# Patient Record
Sex: FEMALE | Race: AMERICAN INDIAN OR ALASKA NATIVE | NOT HISPANIC OR LATINO | Employment: FULL TIME | URBAN - METROPOLITAN AREA
[De-identification: names, ages, dates, MRNs, and addresses within clinical notes are randomized per-mention and may not be internally consistent; named-entity substitution may affect disease eponyms.]

---

## 2017-10-09 ENCOUNTER — OFFICE VISIT (OUTPATIENT)
Dept: URGENT CARE | Facility: URGENT CARE | Age: 22
End: 2017-10-09
Payer: COMMERCIAL

## 2017-10-09 VITALS
WEIGHT: 195.8 LBS | SYSTOLIC BLOOD PRESSURE: 106 MMHG | OXYGEN SATURATION: 97 % | DIASTOLIC BLOOD PRESSURE: 80 MMHG | HEART RATE: 86 BPM | TEMPERATURE: 99.6 F

## 2017-10-09 DIAGNOSIS — H65.92 OME (OTITIS MEDIA WITH EFFUSION), LEFT: ICD-10-CM

## 2017-10-09 DIAGNOSIS — R07.0 THROAT PAIN: ICD-10-CM

## 2017-10-09 DIAGNOSIS — J02.0 STREP THROAT: Primary | ICD-10-CM

## 2017-10-09 LAB
DEPRECATED S PYO AG THROAT QL EIA: ABNORMAL
SPECIMEN SOURCE: ABNORMAL

## 2017-10-09 PROCEDURE — 87880 STREP A ASSAY W/OPTIC: CPT | Performed by: PHYSICIAN ASSISTANT

## 2017-10-09 PROCEDURE — 99203 OFFICE O/P NEW LOW 30 MIN: CPT | Performed by: PHYSICIAN ASSISTANT

## 2017-10-09 RX ORDER — AMOXICILLIN 875 MG
875 TABLET ORAL 2 TIMES DAILY
Qty: 20 TABLET | Refills: 0 | Status: SHIPPED | OUTPATIENT
Start: 2017-10-09 | End: 2017-12-12

## 2017-10-09 NOTE — NURSING NOTE
Chief Complaint   Patient presents with     Urgent Care     URI     Pt states x 2 days ago has had congestion and sore throat. Pt has taken otc medications.       Initial /80 (BP Location: Right arm, Patient Position: Chair, Cuff Size: Adult Large)  Pulse 86  Temp 99.6  F (37.6  C) (Tympanic)  Wt 195 lb 12.8 oz (88.8 kg)  SpO2 97% There is no height or weight on file to calculate BMI.  Medication Reconciliation: unable or not appropriate to perform   Julianne Adams CMA (REMI) 10/9/2017 12:50 PM

## 2017-10-09 NOTE — MR AVS SNAPSHOT
"              After Visit Summary   10[facility]9/2017    [facility]    [facility]: [id number]           Patient Information     Date Of Birth          1995        Visit Information        Provider Department      10/9/2017 12:35 PM Jeniffer Segura PA-C [facility]        Today's Diagnoses     Strep throat    -  1    Throat pain        OME (otitis media with effusion), left           Follow-ups after your visit        Who to contact     If you have questions or need follow up information about today's clinic visit or your schedule please contact [facility] directly at [phone].  Normal or non-critical lab and imaging results will be communicated to you by [employer], letter or phone within 4 business days after the clinic has received the results. If you do not hear from us within 7 days, please contact the clinic through [employer] or phone. If you have a critical or abnormal lab result, we will notify you by phone as soon as possible.  Submit refill requests through Polar or call your pharmacy and they will forward the refill request to us. Please allow 3 business days for your refill to be completed.          Additional Information About Your Visit        MyChart Information     Polar lets you send messages to your doctor, view your test results, renew your prescriptions, schedule appointments and more. To sign up, go to [email or web address] . Click on \"Log in\" on the left side of the screen, which will take you to the Welcome page. Then click on \"Sign up Now\" on the right side of the page.     You will be asked to enter the access code listed below, as well as some personal information. Please follow the directions to create your username and password.     Your access code is: [email or web address]  Expires: 2018  1:23 PM     Your access code will  in 90 days. If you need help or a new code, please call your [facility] clinic or [phone].        Care EveryWhere ID     " This is your Care EveryWhere ID. This could be used by other organizations to access your [facility] medical records  [id number]        Your Vitals Were     Pulse Temperature Pulse Oximetry             86 99.6  F (37.6  C) (Tympanic) 97%          Blood Pressure from Last 3 Encounters:   10/09/17 106/80    Weight from Last 3 Encounters:   10/09/17 195 lb 12.8 oz (88.8 kg)              We Performed the Following     Strep, Rapid Screen          Today's Medication Changes          These changes are accurate as of: 10/9/17  1:23 PM.  If you have any questions, ask your nurse or doctor.               Start taking these medicines.        Dose/Directions    amoxicillin 875 MG tablet   Commonly known as:  AMOXIL   Used for:  Strep throat, OME (otitis media with effusion), left   Started by:  Jeniffer Segura PA-C        Dose:  875 mg   Take 1 tablet (875 mg) by mouth 2 times daily   Quantity:  20 tablet   Refills:  0            Where to get your medicines      These medications were sent to [facility] - [address]   [address]  [address], [place] [address]     Phone:  [phone]     amoxicillin 875 MG tablet                Primary Care Provider Office Phone # Fax #    [facility] [phone]       [address]  [place] [address]        Equal Access to Services     [facility] AH: Hadii jon kimball hadasho Soomaali, waaxda luqadaha, qaybta kaalmada adeegyada, kathy sawyer haykandi jackson. So [facility] [phone].    ATENCIÓN: Si habla español, tiene a collins disposición servicios gratuitos de asistencia lingüística. Llame al [phone].    We comply with applicable federal civil rights laws and [place] laws. We do not discriminate on the basis of race, color, national origin, age, disability, sex, sexual orientation, or gender identity.            Thank you!     Thank you for choosing [facility]  for  your care. Our goal is always to provide you with excellent care. Hearing back from our patients is one way we can continue to improve our services. Please take a few minutes to complete the written survey that you may receive in the mail after your visit with us. Thank you!             Your Updated Medication List - Protect others around you: Learn how to safely use, store and throw away your medicines at www.disposemymeds.org.          This list is accurate as of: 10/9/17  1:23 PM.  Always use your most recent med list.                   Brand Name Dispense Instructions for use Diagnosis    amoxicillin 875 MG tablet    AMOXIL    20 tablet    Take 1 tablet (875 mg) by mouth 2 times daily    Strep throat, OME (otitis media with effusion), left

## 2017-12-12 ENCOUNTER — OFFICE VISIT (OUTPATIENT)
Dept: PEDIATRICS | Facility: CLINIC | Age: 22
End: 2017-12-12
Payer: COMMERCIAL

## 2017-12-12 VITALS
SYSTOLIC BLOOD PRESSURE: 122 MMHG | HEIGHT: 64 IN | TEMPERATURE: 98.3 F | BODY MASS INDEX: 34.15 KG/M2 | OXYGEN SATURATION: 99 % | WEIGHT: 200 LBS | DIASTOLIC BLOOD PRESSURE: 82 MMHG | HEART RATE: 85 BPM

## 2017-12-12 DIAGNOSIS — F43.23 ADJUSTMENT DISORDER WITH MIXED ANXIETY AND DEPRESSED MOOD: ICD-10-CM

## 2017-12-12 DIAGNOSIS — Z11.3 ROUTINE SCREENING FOR STI (SEXUALLY TRANSMITTED INFECTION): ICD-10-CM

## 2017-12-12 DIAGNOSIS — Z00.00 HEALTHCARE MAINTENANCE: Primary | ICD-10-CM

## 2017-12-12 DIAGNOSIS — Z23 NEED FOR VACCINATION: ICD-10-CM

## 2017-12-12 PROCEDURE — 90715 TDAP VACCINE 7 YRS/> IM: CPT | Performed by: NURSE PRACTITIONER

## 2017-12-12 PROCEDURE — G0145 SCR C/V CYTO,THINLAYER,RESCR: HCPCS | Performed by: NURSE PRACTITIONER

## 2017-12-12 PROCEDURE — 90471 IMMUNIZATION ADMIN: CPT | Performed by: NURSE PRACTITIONER

## 2017-12-12 PROCEDURE — 99395 PREV VISIT EST AGE 18-39: CPT | Mod: 25 | Performed by: NURSE PRACTITIONER

## 2017-12-12 PROCEDURE — 99212 OFFICE O/P EST SF 10 MIN: CPT | Mod: 25 | Performed by: NURSE PRACTITIONER

## 2017-12-12 RX ORDER — OMEGA-3 FATTY ACIDS/FISH OIL 300-1000MG
1 CAPSULE ORAL DAILY
Qty: 90 CAPSULE | Refills: 3 | Status: SHIPPED | OUTPATIENT
Start: 2017-12-12 | End: 2018-12-28

## 2017-12-12 RX ORDER — CHOLECALCIFEROL (VITAMIN D3) 50 MCG
2000 TABLET ORAL DAILY
Qty: 90 TABLET | Refills: 3 | Status: SHIPPED | OUTPATIENT
Start: 2017-12-12 | End: 2018-12-28

## 2017-12-12 RX ORDER — CITALOPRAM HYDROBROMIDE 20 MG/1
20 TABLET ORAL DAILY
Qty: 90 TABLET | Refills: 3 | Status: SHIPPED | OUTPATIENT
Start: 2017-12-12 | End: 2018-12-28

## 2017-12-12 ASSESSMENT — PATIENT HEALTH QUESTIONNAIRE - PHQ9
5. POOR APPETITE OR OVEREATING: SEVERAL DAYS
SUM OF ALL RESPONSES TO PHQ QUESTIONS 1-9: 10
10. IF YOU CHECKED OFF ANY PROBLEMS, HOW DIFFICULT HAVE THESE PROBLEMS MADE IT FOR YOU TO DO YOUR WORK, TAKE CARE OF THINGS AT HOME, OR GET ALONG WITH OTHER PEOPLE: EXTREMELY DIFFICULT
SUM OF ALL RESPONSES TO PHQ QUESTIONS 1-9: 10

## 2017-12-12 ASSESSMENT — ANXIETY QUESTIONNAIRES
1. FEELING NERVOUS, ANXIOUS, OR ON EDGE: NEARLY EVERY DAY
3. WORRYING TOO MUCH ABOUT DIFFERENT THINGS: MORE THAN HALF THE DAYS
IF YOU CHECKED OFF ANY PROBLEMS ON THIS QUESTIONNAIRE, HOW DIFFICULT HAVE THESE PROBLEMS MADE IT FOR YOU TO DO YOUR WORK, TAKE CARE OF THINGS AT HOME, OR GET ALONG WITH OTHER PEOPLE: EXTREMELY DIFFICULT
5. BEING SO RESTLESS THAT IT IS HARD TO SIT STILL: NOT AT ALL
2. NOT BEING ABLE TO STOP OR CONTROL WORRYING: MORE THAN HALF THE DAYS
GAD7 TOTAL SCORE: 12
7. FEELING AFRAID AS IF SOMETHING AWFUL MIGHT HAPPEN: SEVERAL DAYS
6. BECOMING EASILY ANNOYED OR IRRITABLE: NEARLY EVERY DAY

## 2017-12-12 NOTE — PROGRESS NOTES
SUBJECTIVE:   CC: Joann Toscano is an 22 year old woman who presents for preventive health visit.   Patient declines flu vaccine today.  States has had gardasil vaccines already.    Physical   Annual:     Getting at least 3 servings of Calcium per day::  Yes    Bi-annual eye exam::  Yes    Dental care twice a year::  NO    Sleep apnea or symptoms of sleep apnea::  None    Diet::  Regular (no restrictions)    Frequency of exercise::  2-3 days/week    Duration of exercise::  15-30 minutes    Taking medications regularly::  Yes    Medication side effects::  Other    Additional concerns today::  YES    Concerns today: wants to discuss birth control pills  Wants to discuss anxiety - PHQ9 and WADE filled out today    -------------------------------------    Today's PHQ-2 Score:   PHQ-2 ( 1999 Pfizer) 12/12/2017   Q1: Little interest or pleasure in doing things 2   Q2: Feeling down, depressed or hopeless 1   PHQ-2 Score 3   Q1: Little interest or pleasure in doing things More than half the days   Q2: Feeling down, depressed or hopeless Several days   PHQ-2 Score 3     Answers for HPI/ROS submitted by the patient on 12/12/2017   PHQ-2 Score: 3  If you checked off any problems, how difficult have these problems made it for you to do your work, take care of things at home, or get along with other people?: Extremely difficult  PHQ9 TOTAL SCORE: 10    Abuse: Current or Past(Physical, Sexual or Emotional)- No  Do you feel safe in your environment - Yes    Social History   Substance Use Topics     Smoking status: Never Smoker     Smokeless tobacco: Never Used     Alcohol use Yes      Comment: 2 times per week, 2 per time     The patient does not drink >3 drinks per day nor >7 drinks per week.    Reviewed orders with patient.  Reviewed health maintenance and updated orders accordingly - Yes  Labs reviewed in EPIC    Mammogram not appropriate for this patient based on age.    Pertinent mammograms are reviewed under the  "imaging tab.  History of abnormal Pap smear: NO - age 21-29 PAP every 3 years recommended    Reviewed and updated as needed this visit by clinical staff  Tobacco  Allergies  Med Hx  Surg Hx  Fam Hx  Soc Hx        Reviewed and updated as needed this visit by Provider        Past Medical History:   Diagnosis Date     NO ACTIVE PROBLEMS         Review of Systems  C: NEGATIVE for fever, chills, change in weight  I: NEGATIVE for worrisome rashes, moles or lesions  E: NEGATIVE for vision changes or irritation  ENT: NEGATIVE for ear, mouth and throat problems  R: NEGATIVE for significant cough or SOB  B: NEGATIVE for masses, tenderness or discharge  CV: NEGATIVE for chest pain, palpitations or peripheral edema  GI: NEGATIVE for nausea, abdominal pain, heartburn, or change in bowel habits  : NEGATIVE for unusual urinary or vaginal symptoms. Periods are regular.  M: NEGATIVE for significant arthralgias or myalgia  N: NEGATIVE for weakness, dizziness or paresthesias  PSYCHIATRIC: anxiety     OBJECTIVE:   /82 (Cuff Size: Adult Large)  Pulse 85  Temp 98.3  F (36.8  C) (Tympanic)  Ht 5' 3.5\" (1.613 m)  Wt 200 lb (90.7 kg)  LMP 11/22/2017 (Approximate)  SpO2 99%  BMI 34.87 kg/m2  Physical Exam  GENERAL: healthy, alert and no distress  EYES: Eyes grossly normal to inspection, PERRL and conjunctivae and sclerae normal  HENT: ear canals and TM's normal, nose and mouth without ulcers or lesions  NECK: no adenopathy, no asymmetry, masses, or scars and thyroid normal to palpation  RESP: lungs clear to auscultation - no rales, rhonchi or wheezes  CV: regular rate and rhythm, normal S1 S2, no S3 or S4, no murmur, click or rub, no peripheral edema and peripheral pulses strong  ABDOMEN: soft, nontender, no hepatosplenomegaly, no masses and bowel sounds normal   (female): normal female external genitalia, normal urethral meatus, vaginal mucosa pink, moist, well rugated, and normal cervix/adnexa/uterus without masses or " discharge  MS: no gross musculoskeletal defects noted, no edema  SKIN: no suspicious lesions or rashes  NEURO: Normal strength and tone, mentation intact and speech normal  PSYCH: mentation appears normal, affect normal/bright    ASSESSMENT/PLAN:   1. Healthcare maintenance  - PAP imaged thin layer screen only - recommended age 21 - 24 years    2. Routine screening for STI (sexually transmitted infection)  - CHLAMYDIA TRACHOMATIS PCR    3. Need for vaccination  - TDAP VACCINE (ADACEL)  - ADMIN 1st VACCINE    4. Adjustment disorder with mixed anxiety and depressed mood  Patient with significant anxiety and depression without any suicidal ideation. Has had to pull out of her semester at college due to anxiety. Has significant anxiety about doing anything new. Significant [facility] anxiety. No manic type sx.   - citalopram (CELEXA) 20 MG tablet; Take 1 tablet (20 mg) by mouth daily  Dispense: 90 tablet; Refill: 3  - MENTAL HEALTH REFERRAL  - Adult; Outpatient Treatment; Individual/Couples/Family/Group Therapy/Health Psychology; [facility] [phone]; We will contact you to schedule the appointment or please call with any questions  - omega 3 1000 MG CAPS; Take 1 g by mouth daily  Dispense: 90 capsule; Refill: 3  - Cholecalciferol (VITAMIN D) 2000 UNITS tablet; Take 2,000 Units by mouth daily  Dispense: 90 tablet; Refill: 3  -Start Celexa, fish oil, vitamin D. Discussed r/b/se, including increased SI.   -Discussed improving the quality of her diet  -Discussed sleeping no more than 8.5 hours per night (currently sleeping more like 10)  -Start therapy once weekly  -Signed paperwork for school stating she needed to pull out of classes, with the condition that she see therapy a minimum of once weekly     COUNSELING:  Reviewed preventive health counseling, as reflected in patient instructions         reports that she has never smoked. She has never used smokeless tobacco.    Estimated body mass index is  "34.87 kg/(m^2) as calculated from the following:    Height as of this encounter: 5' 3.5\" (1.613 m).    Weight as of this encounter: 200 lb (90.7 kg).   Weight management plan: Patient referred to endocrine and/or weight management specialty    Counseling Resources:  ATP IV Guidelines  Pooled Cohorts Equation Calculator  Breast Cancer Risk Calculator  FRAX Risk Assessment  ICSI Preventive Guidelines  Dietary Guidelines for Americans, 2010  USDA's MyPlate  ASA Prophylaxis  Lung CA Screening    JAVY Jones [facility]  "

## 2017-12-12 NOTE — LETTER
December 21, 2017      Joannkathleen Helton [facility]  [address]  [place] [facility] [address]    Dear ,      I am happy to inform you that your recent cervical cancer screening test (PAP smear) was normal.      Preventative screenings such as this help to ensure your health for years to come. You should repeat a pap smear in 3 years, unless otherwise directed.      You will still need to return to the clinic every year for your annual exam and other preventive tests.     Please contact the clinic at [phone] if you have further questions.       Sincerely,      Ermelinda Rios, JAVY CNP/esh

## 2017-12-12 NOTE — NURSING NOTE
"Chief Complaint   Patient presents with     Physical       Initial /82 (Cuff Size: Adult Large)  Pulse 85  Temp 98.3  F (36.8  C) (Tympanic)  Ht 5' 3.5\" (1.613 m)  Wt 200 lb (90.7 kg)  LMP 11/22/2017 (Approximate)  SpO2 99%  BMI 34.87 kg/m2 Estimated body mass index is 34.87 kg/(m^2) as calculated from the following:    Height as of this encounter: 5' 3.5\" (1.613 m).    Weight as of this encounter: 200 lb (90.7 kg).  Medication Reconciliation: complete   Eunice Laguerre CMA    "

## 2017-12-12 NOTE — MR AVS SNAPSHOT
After Visit Summary   12/12/2017    Joann Helton [facility]    MRN: [id number]           Patient Information     Date Of Birth          1995        Visit Information        Provider Department      12/12/2017 8:20 AM Ermelinda Rios APRN [facility] [place]        Today's Diagnoses     Healthcare maintenance    -  1    Routine screening for STI (sexually transmitted infection)        Need for vaccination        Adjustment disorder with mixed anxiety and depressed mood          Care Instructions    Try not to be in bed for more than 8.5 hours  Start fish oil. 1 G per day  Start vitamin D 2,000 iu per day  Try to eat anything other than pizza, chinese food, etc.  Start Celexa: 1/2 tab a day for 3 days then full tab.      See me in 1 month      Preventive Health Recommendations  Female Ages 18 to 25     Yearly exam:     See your health care provider every year in order to  o Review health changes.   o Discuss preventive care.    o Review your medicines if your doctor has prescribed any.      You should be tested each year for STDs (sexually transmitted diseases).       After age 20, talk to your provider about how often you should have cholesterol testing.      Starting at age 21, get a Pap test every three years. If you have an abnormal result, your doctor may have you test more often.      If you are at risk for diabetes, you should have a diabetes test (fasting glucose).     Shots:     Get a flu shot each year.     Get a tetanus shot every 10 years.     Consider getting the shot (vaccine) that prevents cervical cancer (Gardasil).    Nutrition:     Eat at least 5 servings of fruits and vegetables each day.    Eat whole-grain bread, whole-wheat pasta and brown rice instead of white grains and rice.    Talk to your provider about Calcium and Vitamin D.     Lifestyle    Exercise at least 150 minutes a week each week (30 minutes a day, 5 days a week). This will help you control your weight  and prevent disease.    Limit alcohol to one drink per day.    No smoking.     Wear sunscreen to prevent skin cancer.    See your dentist every six months for an exam and cleaning.          Follow-ups after your visit        Additional Services     MENTAL HEALTH REFERRAL  - Adult; Outpatient Treatment; Individual/Couples/Family/Group Therapy/Health Psychology; [facility]: [facility] [phone]; We will contact you to schedule the appointment or please call with any questions       All scheduling is subject to the client's specific insurance plan & benefits, provider/location availability, and provider clinical specialities.  Please arrive 15 minutes early for your first appointment and bring your completed paperwork.    Please be aware that coverage of these services is subject to the terms and limitations of your health insurance plan.  Call member services at your health plan with any benefit or coverage questions.                            Who to contact     If you have questions or need follow up information about today's clinic visit or your schedule please contact [facility] directly at [phone].  Normal or non-critical lab and imaging results will be communicated to you by Jimuboxhart, letter or phone within 4 business days after the clinic has received the results. If you do not hear from us within 7 days, please contact the clinic through [employer] or phone. If you have a critical or abnormal lab result, we will notify you by phone as soon as possible.  Submit refill requests through [email or web address] or call your pharmacy and they will forward the refill request to us. Please allow 3 business days for your refill to be completed.          Additional Information About Your Visit        Jim[employer] Information     [email or web address] lets you send messages to your doctor, view your test results, renew your prescriptions, schedule appointments and more. To sign up, go to [email or web address] . Click on  "\"Log in\" on the left side of the screen, which will take you to the Welcome page. Then click on \"Sign up Now\" on the right side of the page.     You will be asked to enter the access code listed below, as well as some personal information. Please follow the directions to create your username and password.     Your access code is: [email or web address]  Expires: 2018 12:23 PM     Your access code will  in 90 days. If you need help or a new code, please call your [facility] clinic or [phone].        Care EveryWhere ID     This is your Care EveryWhere ID. This could be used by other organizations to access your [facility] medical records  [id number]        Your Vitals Were     Pulse Temperature Height Last Period Pulse Oximetry BMI (Body Mass Index)    85 98.3  F (36.8  C) (Tympanic) 5' 3.5\" (1.613 m) 2017 (Approximate) 99% 34.87 kg/m2       Blood Pressure from Last 3 Encounters:   17 122/82   10/09/17 106/80    Weight from Last 3 Encounters:   17 200 lb (90.7 kg)   10/09/17 195 lb 12.8 oz (88.8 kg)              We Performed the Following     ADMIN 1st VACCINE     CHLAMYDIA TRACHOMATIS PCR     MENTAL HEALTH REFERRAL  - Adult; Outpatient Treatment; Individual/Couples/Family/Group Therapy/Health Psychology; [facility]: [facility] [phone]; We will contact you to schedule the appointment or please call with any questions     PAP imaged thin layer screen only - recommended age 21 - 24 years     SCREENING QUESTIONS FOR ADULT IMMUNIZATIONS     TDAP VACCINE (ADACEL)          Today's Medication Changes          These changes are accurate as of: 17  8:54 AM.  If you have any questions, ask your nurse or doctor.               Start taking these medicines.        Dose/Directions    citalopram 20 MG tablet   Commonly known as:  celeXA   Used for:  Adjustment disorder with mixed anxiety and depressed mood   Started by:  Ermelinda Rios APRN CNP        Dose:  20 mg   Take 1 tablet " (20 mg) by mouth daily   Quantity:  90 tablet   Refills:  3       omega 3 1000 MG Caps   Used for:  Adjustment disorder with mixed anxiety and depressed mood   Started by:  Ermelinda Rios APRN CNP        Dose:  1 g   Take 1 g by mouth daily   Quantity:  90 capsule   Refills:  3       vitamin D 2000 UNITS tablet   Used for:  Adjustment disorder with mixed anxiety and depressed mood   Started by:  Ermelinda Rios APRN CNP        Dose:  2000 Units   Take 2,000 Units by mouth daily   Quantity:  90 tablet   Refills:  3            Where to get your medicines      These medications were sent to [facility] [place] - [facility] - [address]   [address]  [address], [place] [address]     Phone:  [phone]     citalopram 20 MG tablet    omega 3 1000 MG Caps    vitamin D 2000 UNITS tablet                Primary Care Provider Office Phone # Fax #    [facility] [phone] [address]  [place] [address]        Equal Access to Services     [facility] AH: Hadii jon ku hadasho Soomaali, waaxda luqadaha, qaybta kaalmada adeegyada, waxay yudyin haykandi terrell . So [facility] [phone].    ATENCIÓN: Si lornala espdana, tiene a collins disposición servicios gratuitos de asistencia lingüística. Llame al [phone].    We comply with applicable federal civil rights laws and [place] laws. We do not discriminate on the basis of race, color, national origin, age, disability, sex, sexual orientation, or gender identity.            Thank you!     Thank you for choosing [facility]  for your care. Our goal is always to provide you with excellent care. Hearing back from our patients is one way we can continue to improve our services. Please take a few minutes to complete the written survey that you may receive in the mail after your visit with us. Thank you!             Your Updated Medication List - Protect others around you:  Learn how to safely use, store and throw away your medicines at www.disposemymeds.org.          This list is accurate as of: 12/12/17  8:54 AM.  Always use your most recent med list.                   Brand Name Dispense Instructions for use Diagnosis    citalopram 20 MG tablet    celeXA    90 tablet    Take 1 tablet (20 mg) by mouth daily    Adjustment disorder with mixed anxiety and depressed mood       omega 3 1000 MG Caps     90 capsule    Take 1 g by mouth daily    Adjustment disorder with mixed anxiety and depressed mood       vitamin D 2000 UNITS tablet     90 tablet    Take 2,000 Units by mouth daily    Adjustment disorder with mixed anxiety and depressed mood

## 2017-12-12 NOTE — PATIENT INSTRUCTIONS
Try not to be in bed for more than 8.5 hours  Start fish oil. 1 G per day  Start vitamin D 2,000 iu per day  Try to eat anything other than pizza, chinese food, etc.  Start Celexa: 1/2 tab a day for 3 days then full tab.      See me in 1 month      Preventive Health Recommendations  Female Ages 18 to 25     Yearly exam:     See your health care provider every year in order to  o Review health changes.   o Discuss preventive care.    o Review your medicines if your doctor has prescribed any.      You should be tested each year for STDs (sexually transmitted diseases).       After age 20, talk to your provider about how often you should have cholesterol testing.      Starting at age 21, get a Pap test every three years. If you have an abnormal result, your doctor may have you test more often.      If you are at risk for diabetes, you should have a diabetes test (fasting glucose).     Shots:     Get a flu shot each year.     Get a tetanus shot every 10 years.     Consider getting the shot (vaccine) that prevents cervical cancer (Gardasil).    Nutrition:     Eat at least 5 servings of fruits and vegetables each day.    Eat whole-grain bread, whole-wheat pasta and brown rice instead of white grains and rice.    Talk to your provider about Calcium and Vitamin D.     Lifestyle    Exercise at least 150 minutes a week each week (30 minutes a day, 5 days a week). This will help you control your weight and prevent disease.    Limit alcohol to one drink per day.    No smoking.     Wear sunscreen to prevent skin cancer.    See your dentist every six months for an exam and cleaning.

## 2017-12-13 DIAGNOSIS — Z11.3 ROUTINE SCREENING FOR STI (SEXUALLY TRANSMITTED INFECTION): Primary | ICD-10-CM

## 2017-12-13 LAB
C TRACH DNA SPEC QL NAA+PROBE: ABNORMAL
SPECIMEN SOURCE: ABNORMAL

## 2017-12-13 ASSESSMENT — PATIENT HEALTH QUESTIONNAIRE - PHQ9: SUM OF ALL RESPONSES TO PHQ QUESTIONS 1-9: 10

## 2017-12-13 ASSESSMENT — ANXIETY QUESTIONNAIRES: GAD7 TOTAL SCORE: 12

## 2017-12-14 LAB
COPATH REPORT: NORMAL
PAP: NORMAL

## 2018-12-28 DIAGNOSIS — F43.23 ADJUSTMENT DISORDER WITH MIXED ANXIETY AND DEPRESSED MOOD: ICD-10-CM

## 2018-12-28 NOTE — TELEPHONE ENCOUNTER
"Requested Prescriptions   Pending Prescriptions Disp Refills     citalopram (CELEXA) 20 MG tablet [Pharmacy Med Name: CITALOPRAM 20MG TAB]  Last Written Prescription Date:  12-  Last Fill Quantity: 90 tablet,  # refills: 3   Last office visit: 12/12/2017 with prescribing provider:     Future Office Visit:     90 tablet 3     Sig: TAKE ONE TABLET BY MOUTH EVERY DAY    SSRIs Protocol Failed - 12/28/2018  2:40 PM       Failed - Recent (12 mo) or future (30 days) visit within the authorizing provider's specialty    Patient had office visit in the last 12 months or has a visit in the next 30 days with authorizing provider or within the authorizing provider's specialty.  See \"Patient Info\" tab in inbasket, or \"Choose Columns\" in Meds & Orders section of the refill encounter.             Passed - Patient is age 18 or older       Passed - No active pregnancy on record       Passed - No positive pregnancy test in last 12 months                D-2000 MAXIMUM STRENGTH 2000 units tablet [Pharmacy Med Name: D-2000 MAXIMUM STRENGTH 2000 TABS]  Last Written Prescription Date:  12-  Last Fill Quantity: 90 tablet,  # refills: 3   Last office visit: 12/12/2017 with prescribing provider:     Future Office Visit:     110 tablet 3     Sig: TAKE ONE TABLET BY MOUTH EVERY DAY    Vitamin Supplements (Adult) Protocol Failed - 12/28/2018  2:40 PM       Failed - Recent (12 mo) or future (30 days) visit within the authorizing provider's specialty    Patient had office visit in the last 12 months or has a visit in the next 30 days with authorizing provider or within the authorizing provider's specialty.  See \"Patient Info\" tab in inbasket, or \"Choose Columns\" in Meds & Orders section of the refill encounter.             Passed - High dose Vitamin D not ordered                90 capsule 3     Sig: TAKE ONE CAPSULE BY MOUTH EVERY DAY    Antihyperlipidemic agents Failed - 12/28/2018  2:40 PM       Failed - Lipid panel on file in past " "12 mos    No lab results found.           Failed - Normal serum ALT on record in past 12 mos    No lab results found.         Failed - Recent (12 mo) or future (30 days) visit within the authorizing provider's specialty    Patient had office visit in the last 12 months or has a visit in the next 30 days with authorizing provider or within the authorizing provider's specialty.  See \"Patient Info\" tab in inbasket, or \"Choose Columns\" in Meds & Orders section of the refill encounter.             Passed - Patient is age 18 years or older       Passed - No active pregnancy on record       Passed - No positive pregnancy test in past 12 mos                                                                                                                 omega-3 acid ethyl esters (LOVAZA) 1 g capsule [Pharmacy Med Name: OMEGA-3-ACID ETHYL ESTERS 1GM CAPS]  Last Written Prescription Date:  12-  Last Fill Quantity: 90 capsule,  # refills: 3   Last office visit: 12/12/2017 with prescribing provider:     Future Office Visit:     90 capsule 3     Sig: TAKE ONE CAPSULE BY MOUTH EVERY DAY    Antihyperlipidemic agents Failed - 12/28/2018  2:40 PM       Failed - Lipid panel on file in past 12 mos    No lab results found.           Failed - Normal serum ALT on record in past 12 mos    No lab results found.         Failed - Recent (12 mo) or future (30 days) visit within the authorizing provider's specialty    Patient had office visit in the last 12 months or has a visit in the next 30 days with authorizing provider or within the authorizing provider's specialty.  See \"Patient Info\" tab in inbasket, or \"Choose Columns\" in Meds & Orders section of the refill encounter.             Passed - Patient is age 18 years or older       Passed - No active pregnancy on record       Passed - No positive pregnancy test in past 12 mos            "

## 2019-01-02 RX ORDER — CITALOPRAM HYDROBROMIDE 20 MG/1
TABLET ORAL
Qty: 30 TABLET | Refills: 0 | Status: SHIPPED | OUTPATIENT
Start: 2019-01-02 | End: 2023-11-14

## 2019-01-02 RX ORDER — OMEGA-3-ACID ETHYL ESTERS 1 G/1
CAPSULE, LIQUID FILLED ORAL
Qty: 90 CAPSULE | Refills: 0 | Status: SHIPPED | OUTPATIENT
Start: 2019-01-02 | End: 2023-11-14

## 2019-02-06 ENCOUNTER — OFFICE VISIT - HEALTHEAST (OUTPATIENT)
Dept: FAMILY MEDICINE | Facility: CLINIC | Age: 24
End: 2019-02-06

## 2019-02-06 ENCOUNTER — COMMUNICATION - HEALTHEAST (OUTPATIENT)
Dept: TELEHEALTH | Facility: CLINIC | Age: 24
End: 2019-02-06

## 2019-02-06 DIAGNOSIS — F32.0 CURRENT MILD EPISODE OF MAJOR DEPRESSIVE DISORDER WITHOUT PRIOR EPISODE (H): ICD-10-CM

## 2019-02-06 DIAGNOSIS — Z00.00 ROUTINE GENERAL MEDICAL EXAMINATION AT A HEALTH CARE FACILITY: ICD-10-CM

## 2019-02-06 LAB
ALBUMIN SERPL-MCNC: 3.9 G/DL (ref 3.5–5)
ALP SERPL-CCNC: 99 U/L (ref 45–120)
ALT SERPL W P-5'-P-CCNC: 22 U/L (ref 0–45)
ANION GAP SERPL CALCULATED.3IONS-SCNC: 5 MMOL/L (ref 5–18)
AST SERPL W P-5'-P-CCNC: 15 U/L (ref 0–40)
BASOPHILS # BLD AUTO: 0 THOU/UL (ref 0–0.2)
BASOPHILS NFR BLD AUTO: 1 % (ref 0–2)
BILIRUB SERPL-MCNC: 0.4 MG/DL (ref 0–1)
BUN SERPL-MCNC: 14 MG/DL (ref 8–22)
CALCIUM SERPL-MCNC: 9.8 MG/DL (ref 8.5–10.5)
CHLORIDE BLD-SCNC: 106 MMOL/L (ref 98–107)
CHOLEST SERPL-MCNC: 194 MG/DL
CO2 SERPL-SCNC: 31 MMOL/L (ref 22–31)
CREAT SERPL-MCNC: 0.86 MG/DL (ref 0.6–1.1)
EOSINOPHIL # BLD AUTO: 0.3 THOU/UL (ref 0–0.4)
EOSINOPHIL NFR BLD AUTO: 4 % (ref 0–6)
ERYTHROCYTE [DISTWIDTH] IN BLOOD BY AUTOMATED COUNT: 11.1 % (ref 11–14.5)
FASTING STATUS PATIENT QL REPORTED: YES
GFR SERPL CREATININE-BSD FRML MDRD: >60 ML/MIN/1.73M2
GLUCOSE BLD-MCNC: 87 MG/DL (ref 70–125)
HCT VFR BLD AUTO: 41.2 % (ref 35–47)
HDLC SERPL-MCNC: 47 MG/DL
HGB BLD-MCNC: 13.8 G/DL (ref 12–16)
LDLC SERPL CALC-MCNC: 126 MG/DL
LYMPHOCYTES # BLD AUTO: 3.8 THOU/UL (ref 0.8–4.4)
LYMPHOCYTES NFR BLD AUTO: 53 % (ref 20–40)
MCH RBC QN AUTO: 30 PG (ref 27–34)
MCHC RBC AUTO-ENTMCNC: 33.4 G/DL (ref 32–36)
MCV RBC AUTO: 90 FL (ref 80–100)
MONOCYTES # BLD AUTO: 0.4 THOU/UL (ref 0–0.9)
MONOCYTES NFR BLD AUTO: 6 % (ref 2–10)
NEUTROPHILS # BLD AUTO: 2.6 THOU/UL (ref 2–7.7)
NEUTROPHILS NFR BLD AUTO: 37 % (ref 50–70)
PLATELET # BLD AUTO: 356 THOU/UL (ref 140–440)
PMV BLD AUTO: 6.5 FL (ref 7–10)
POTASSIUM BLD-SCNC: 4.7 MMOL/L (ref 3.5–5)
PROT SERPL-MCNC: 7 G/DL (ref 6–8)
RBC # BLD AUTO: 4.59 MILL/UL (ref 3.8–5.4)
SODIUM SERPL-SCNC: 142 MMOL/L (ref 136–145)
T4 FREE SERPL-MCNC: 1 NG/DL (ref 0.7–1.8)
TRIGL SERPL-MCNC: 107 MG/DL
TSH SERPL DL<=0.005 MIU/L-ACNC: 5.28 UIU/ML (ref 0.3–5)
WBC: 7.1 THOU/UL (ref 4–11)

## 2019-02-06 ASSESSMENT — MIFFLIN-ST. JEOR: SCORE: 1709.77

## 2019-02-07 ENCOUNTER — COMMUNICATION - HEALTHEAST (OUTPATIENT)
Dept: FAMILY MEDICINE | Facility: CLINIC | Age: 24
End: 2019-02-07

## 2019-02-07 ENCOUNTER — AMBULATORY - HEALTHEAST (OUTPATIENT)
Dept: FAMILY MEDICINE | Facility: CLINIC | Age: 24
End: 2019-02-07

## 2019-02-07 DIAGNOSIS — E07.9 THYROID DYSFUNCTION: ICD-10-CM

## 2019-05-06 ENCOUNTER — OFFICE VISIT - HEALTHEAST (OUTPATIENT)
Dept: FAMILY MEDICINE | Facility: CLINIC | Age: 24
End: 2019-05-06

## 2019-05-06 DIAGNOSIS — F32.5 MAJOR DEPRESSIVE DISORDER IN FULL REMISSION, UNSPECIFIED WHETHER RECURRENT (H): ICD-10-CM

## 2019-05-06 ASSESSMENT — MIFFLIN-ST. JEOR: SCORE: 1698.09

## 2019-05-13 ENCOUNTER — COMMUNICATION - HEALTHEAST (OUTPATIENT)
Dept: BEHAVIORAL HEALTH | Facility: CLINIC | Age: 24
End: 2019-05-13

## 2019-06-05 ENCOUNTER — AMBULATORY - HEALTHEAST (OUTPATIENT)
Dept: BEHAVIORAL HEALTH | Facility: CLINIC | Age: 24
End: 2019-06-05

## 2021-05-28 NOTE — TELEPHONE ENCOUNTER
Writer left message for patient to call [facility] with updated insurance information for upcoming appointment on 5/15.      Regarding email from Insurance verification team:  Good morning,  Please contact this patient prior to her appointment to obtain updated insurance as [employer] termed on 4/30/19 incase patient needs a prior authorization.  No other insurance is on file.  Dorcas  MRN [id number]  Appointment Date: 5/15/2019  Thank you,  Cherri Roblero  Insurance Verification, A/R [facility]  Office: [phone]

## 2021-05-28 NOTE — PROGRESS NOTES
Family Medicine Office Visit  [facility]  Patient Name: Joann Toscano  Patient Age: 23 y.o.  YOB: 1995  MRN: [id number]    Date of Visit: 2019  Reason for Office Visit:   Chief Complaint   Patient presents with     Follow-up     depression           Assessment / Plan / Medical Decision Makin. Major depressive disorder in full remission, unspecified whether recurrent (H)  Refill medication and referral to psychology placed.  Filled out school paperwork for Joann   - citalopram (CELEXA) 20 MG tablet; Take 1 tablet (20 mg total) by mouth daily.  Dispense: 90 tablet; Refill: 2  - Ambulatory referral to Psychology        Health Maintenance Review  Health Maintenance   Topic Date Due     DEPRESSION FOLLOW UP  1996     CHLAMYDIA SCREENING  2011     PAP SMEAR  2020     ADVANCE DIRECTIVES DISCUSSED WITH PATIENT  2024     TD 18+ HE  2027     HPV VACCINES  Completed     INFLUENZA VACCINE RULE BASED  Completed     TDAP ADULT ONE TIME DOSE  Completed         I am having Joann Toscano maintain her omega-3 acid ethyl esters, cholecalciferol (vitamin D3), and citalopram.      HPI:  Joann Toscano is a 23 y.o. year old who presents to the office today for follow up on depression.  Doing well on celexa.  Sleeping and eating well.  Energy level improving.  Would like to see therapist.  Going to school but problems concentrating and focusing.          Review of Systems- pertinent positive in bold:  Constitutional: Fever, chills, night sweats, fainting, weight change, fatigue, seizures, dizziness, sleeping difficulties, loud snoring/pauses in breathing  Eyes: change in vision, blurred or double vision, redness/eye pain  Ears, nose, mouth, throat: change in hearing, ear pain, hoarseness, difficulty swallowing, sores in the mouth or throat  Respiratory: shortness of breath, cough, bloody sputum, wheezing  Cardiovascular: chest pain,  "palpitations   Gastrointestinal: abdominal pain, heartburn/indigestion, nausea/vomiting, change in appetite, change in bowel habits, constipation or diarrhea, rectal bleeding/dark stools, difficulty swallowing  Urinary: painful urination, frequent urination, urinary urgency/incontinence, blood in urine/dark urine, nocturia  Musculoskeletal: backache/back pain (new or increasing), weakness, joint pain/stiffness (new or increasing), muscle cramps, swelling of hands, feet, ankles, leg pain/redness  Skin: change in moles/freckles, rash, nodules  Hematologic/lymphatic: swollen lymph glands, abnormal bruising/bleeding  Endocrine: excessive thirst/urination, cold or heat intolerance  Neurologic/emotional: worrisome memory change, numbness/tingling, anxiety, mood swings      Current Scheduled Meds:  Outpatient Encounter Medications as of 5/6/2019   Medication Sig Dispense Refill     cholecalciferol, vitamin D3, 2,000 unit Tab TAKE ONE TABLET BY MOUTH EVERY DAY       citalopram (CELEXA) 20 MG tablet Take 1 tablet (20 mg total) by mouth daily. 90 tablet 2     omega-3 acid ethyl esters (LOVAZA) 1 gram capsule TAKE ONE CAPSULE BY MOUTH EVERY DAY       [DISCONTINUED] citalopram (CELEXA) 20 MG tablet Take 1 tablet (20 mg total) by mouth daily. 90 tablet 0     No facility-administered encounter medications on file as of 5/6/2019.      Past Medical History:   Diagnosis Date     Depression      No past surgical history on file.  Social History     Tobacco Use     Smoking status: Never Smoker     Smokeless tobacco: Never Used   Substance Use Topics     Alcohol use: Yes     Comment: social     Drug use: No       Objective / Physical Examination:  Vitals:    05/06/19 0743   BP: 122/76   Patient Site: Right Arm   Patient Position: Sitting   Cuff Size: Adult Regular   Pulse: 74   SpO2: 98%   Weight: 214 lb 4.8 oz (97.2 kg)   Height: 5' 3.75\" (1.619 m)     Wt Readings from Last 3 Encounters:   05/06/19 214 lb 4.8 oz (97.2 kg)   02/06/19 " 216 lb (98 kg)     BP Readings from Last 3 Encounters:   05/06/19 122/76   02/06/19 110/68     Body mass index is 37.07 kg/m .   Results for orders placed or performed in visit on 02/06/19   Lipid Cascade RANDOM   Result Value Ref Range    Cholesterol 194 <=199 mg/dL    Triglycerides 107 <=149 mg/dL    HDL Cholesterol 47 (L) >=50 mg/dL    LDL Calculated 126 <=129 mg/dL    Patient Fasting > 8hrs? Yes    Comprehensive Metabolic Panel   Result Value Ref Range    Sodium 142 136 - 145 mmol/L    Potassium 4.7 3.5 - 5.0 mmol/L    Chloride 106 98 - 107 mmol/L    CO2 31 22 - 31 mmol/L    Anion Gap, Calculation 5 5 - 18 mmol/L    Glucose 87 70 - 125 mg/dL    BUN 14 8 - 22 mg/dL    Creatinine 0.86 0.60 - 1.10 mg/dL    GFR MDRD Af Amer >60 >60 mL/min/1.73m2    GFR MDRD Non Af Amer >60 >60 mL/min/1.73m2    Bilirubin, Total 0.4 0.0 - 1.0 mg/dL    Calcium 9.8 8.5 - 10.5 mg/dL    Protein, Total 7.0 6.0 - 8.0 g/dL    Albumin 3.9 3.5 - 5.0 g/dL    Alkaline Phosphatase 99 45 - 120 U/L    AST 15 0 - 40 U/L    ALT 22 0 - 45 U/L   Thyroid Cascade   Result Value Ref Range    TSH 5.28 (H) 0.30 - 5.00 uIU/mL   HM1 (CBC with Diff)   Result Value Ref Range    WBC 7.1 4.0 - 11.0 thou/uL    RBC 4.59 3.80 - 5.40 mill/uL    Hemoglobin 13.8 12.0 - 16.0 g/dL    Hematocrit 41.2 35.0 - 47.0 %    MCV 90 80 - 100 fL    MCH 30.0 27.0 - 34.0 pg    MCHC 33.4 32.0 - 36.0 g/dL    RDW 11.1 11.0 - 14.5 %    Platelets 356 140 - 440 thou/uL    MPV 6.5 (L) 7.0 - 10.0 fL    Neutrophils % 37 (L) 50 - 70 %    Lymphocytes % 53 (H) 20 - 40 %    Monocytes % 6 2 - 10 %    Eosinophils % 4 0 - 6 %    Basophils % 1 0 - 2 %    Neutrophils Absolute 2.6 2.0 - 7.7 thou/uL    Lymphocytes Absolute 3.8 0.8 - 4.4 thou/uL    Monocytes Absolute 0.4 0.0 - 0.9 thou/uL    Eosinophils Absolute 0.3 0.0 - 0.4 thou/uL    Basophils Absolute 0.0 0.0 - 0.2 thou/uL   T4, Free   Result Value Ref Range    Free T4 1.0 0.7 - 1.8 ng/dL           General Appearance: Alert and oriented, cooperative,  affect appropriate, speech clear, in no apparent distress  Head: Normocephalic, atraumatic  Integumentary: Warm and dry. Without suspicious looking lesions  Neuro: Alert and oriented, follows commands appropriately.     Orders Placed This Encounter   Procedures     Ambulatory referral to Psychology   Followup: Return in about 9 months (around 2/6/2020) for Annual physical, Recheck depression. earlier if needed.    Total time spent with patient was 15 min with >50% of time spent in face-to-face counseling regarding the above plan       Jennyfer Schrader MD

## 2021-06-02 VITALS — HEIGHT: 64 IN | BODY MASS INDEX: 36.88 KG/M2 | WEIGHT: 216 LBS

## 2021-06-03 VITALS — BODY MASS INDEX: 36.58 KG/M2 | HEIGHT: 64 IN | WEIGHT: 214.3 LBS

## 2021-06-18 NOTE — LETTER
Letter by Jennyfer Schrader MD at      Author: Jennyfer Schrader MD Service: -- Author Type: --    Filed:  Encounter Date: 2/7/2019 Status: (Other)        Joann VELAZQUEZ Dorcas  [address] [place] [address]             February 7, 2019         Dear Joann Toscano,    Below are the results from Joann's recent visit:    Resulted Orders   Lipid Cascade RANDOM   Result Value Ref Range    Cholesterol 194 <=199 mg/dL    Triglycerides 107 <=149 mg/dL    HDL Cholesterol 47 (L) >=50 mg/dL    LDL Calculated 126 <=129 mg/dL    Patient Fasting > 8hrs? Yes    Comprehensive Metabolic Panel   Result Value Ref Range    Sodium 142 136 - 145 mmol/L    Potassium 4.7 3.5 - 5.0 mmol/L    Chloride 106 98 - 107 mmol/L    CO2 31 22 - 31 mmol/L    Anion Gap, Calculation 5 5 - 18 mmol/L    Glucose 87 70 - 125 mg/dL    BUN 14 8 - 22 mg/dL    Creatinine 0.86 0.60 - 1.10 mg/dL    GFR MDRD Af Amer >60 >60 mL/min/1.73m2    GFR MDRD Non Af Amer >60 >60 mL/min/1.73m2    Bilirubin, Total 0.4 0.0 - 1.0 mg/dL    Calcium 9.8 8.5 - 10.5 mg/dL    Protein, Total 7.0 6.0 - 8.0 g/dL    Albumin 3.9 3.5 - 5.0 g/dL    Alkaline Phosphatase 99 45 - 120 U/L    AST 15 0 - 40 U/L    ALT 22 0 - 45 U/L    Narrative    Fasting Glucose reference range is 70-99 mg/dL per  American Diabetes Association (ADA) guidelines.   Thyroid Cascade   Result Value Ref Range    TSH 5.28 (H) 0.30 - 5.00 uIU/mL   HM1 (CBC with Diff)   Result Value Ref Range    WBC 7.1 4.0 - 11.0 thou/uL    RBC 4.59 3.80 - 5.40 mill/uL    Hemoglobin 13.8 12.0 - 16.0 g/dL    Hematocrit 41.2 35.0 - 47.0 %    MCV 90 80 - 100 fL    MCH 30.0 27.0 - 34.0 pg    MCHC 33.4 32.0 - 36.0 g/dL    RDW 11.1 11.0 - 14.5 %    Platelets 356 140 - 440 thou/uL    MPV 6.5 (L) 7.0 - 10.0 fL    Neutrophils % 37 (L) 50 - 70 %    Lymphocytes % 53 (H) 20 - 40 %    Monocytes % 6 2 - 10 %    Eosinophils % 4 0 - 6 %    Basophils % 1 0 - 2 %    Neutrophils Absolute 2.6 2.0 - 7.7 thou/uL    Lymphocytes Absolute 3.8 0.8 - 4.4  thou/uL    Monocytes Absolute 0.4 0.0 - 0.9 thou/uL    Eosinophils Absolute 0.3 0.0 - 0.4 thou/uL    Basophils Absolute 0.0 0.0 - 0.2 thou/uL   T4, Free   Result Value Ref Range    Free T4 1.0 0.7 - 1.8 ng/dL     Your thyroid is slightly low. We will watch this at this time. Please make a lab only appointment in 2 months for a recheck of the thyroid.    Please call with questions or contact us using [employer].    Sincerely,        Electronically signed by Jennyfer Schrader MD

## 2021-06-23 NOTE — PROGRESS NOTES
Thyroid slightly low but can simply watch it and recommend repeat in 2 months.  Lab placed and please make lab appointment

## 2021-06-27 NOTE — PROGRESS NOTES
Progress Notes by Jennyfer Schrader MD at 2/6/2019  8:00 AM     Author: Jennyfer Schrader MD Service: -- Author Type: Physician    Filed: 2/6/2019  9:15 AM Encounter Date: 2/6/2019 Status: Signed    : Jennyfer Schrader MD (Physician)       FEMALE PREVENTATIVE EXAM    Assessment and Plan:       1. Routine general medical examination at a health care facility  Routine labs ordered and will call with the results    2. Current mild episode of major depressive disorder without prior episode (H)  Refill medication.  Discussed how to take medication and any worsening symptoms, to immediately go to the ER        Next follow up:  Return in about 3 months (around 5/6/2019).    Immunization Review  Adult Imm Review: Stated as current, no records available  BMI: discussed diet and exercise  Documented tobacco use.  Website and phone contact for [employer] given to patient in AVS.    I discussed the following with the patient:   Adult Healthy Living: Importance of regular exercise  Healthy nutrition  Weight loss referral options  Getting adequate sleep  Stress management  Use of seat belts    I have had an Advance Directives discussion with the patient.    Subjective:   Chief Complaint: Joann Toscano is an 23 y.o. female here for a preventative health visit.     HPI:  Pt is here to establish care.  Hx of depression and currently on celexa.  Doing well with that.  States initially given the medication and then stopped it for about 9 months and then restarted it in January.  Feels like mood is better, sleeping improved, no change in appetite.  Recently moved to the area.  States immunizations up to date and LADARIUS signed for records from [place]    Healthy Habits  Are you taking a daily aspirin? No  Do you typically exercising at least 40 min, 3-4 times per week?  Yes  Do you usually eat at least 4 servings of fruit and vegetables a day, include whole grains and fiber and avoid regularly eating high fat foods? Yes  Have you had an eye  "exam in the past two years? NO  Do you see a dentist twice per year? NO  Do you have any concerns regarding sleep? No    Safety Screen  If you own firearms, are they secured in a locked gun cabinet or with trigger locks? NO  Do you feel you are safe where you are living?: Yes (2/6/2019  8:06 AM)  Do you feel you are safe in your relationship(s)?: Yes (2/6/2019  8:06 AM)      Review of Systems:  Please see above.  The rest of the review of systems are negative for all systems.     Pap History:   No - age 21-29 PAP every 3 years recommended  Cancer Screening       Status Date      PAP SMEAR Next Due 12/1/2020      Done 12/1/2017           Patient Care Team:  Jennyfer Schrader MD as PCP - General (Family Medicine)        History     Reviewed By Date/Time Sections Reviewed    Jennyfer Schrader MD 2/6/2019  8:11 AM Tobacco, Alcohol, Drug Use, Sexual Activity    Jennyfer Schrader MD 2/6/2019  8:10 AM Medical, Surgical, Family    Rani Kruse, [facility] 2/6/2019  8:06 AM Tobacco            Objective:   Vital Signs:   Visit Vitals  /68 (Patient Site: Right Arm)   Pulse 88   Ht 5' 4\" (1.626 m)   Wt 216 lb (98 kg)   LMP 02/01/2019   SpO2 99%   BMI 37.08 kg/m           PHYSICAL EXAM    Physical Exam:  General Appearance: Alert, cooperative, no distress, appears stated age   Head: Normocephalic, without obvious abnormality, atraumatic  Eyes: PERRL, conjunctiva/corneas clear, EOM's intact   Ears: Normal TM's and external ear canals, both ears  Nose:Nares normal, septum midline,mucosa normal, no drainage    Throat:Lips, mucosa, and tongue normal; teeth and gums normal  Neck: Supple, symmetrical, trachea midline, no adenopathy;  thyroid: not enlarged, symmetric, no tenderness/mass/nodules  Back: Symmetric, no curvature, ROM normal,  Lungs: Clear to auscultation bilaterally, respirations unlabored  Heart: Regular rate and rhythm, S1 and S2 normal, no murmur, rub, or gallop  Abdomen: Soft, non-tender, bowel sounds active all four " quadrants,  no masses, no organomegaly  Extremities: Extremities normal, atraumatic, no cyanosis or edema  Skin: Skin color, texture, turgor normal, no rashes or lesions  Lymph nodes: Cervical, supraclavicular, and axillary nodes normal  Neurologic: Normal       Medication List           Accurate as of 2/6/19  8:37 AM. If you have any questions, ask your nurse or doctor.               CONTINUE taking these medications    cholecalciferol (vitamin D3) 2,000 unit Tab  INSTRUCTIONS:  TAKE ONE TABLET BY MOUTH EVERY DAY        citalopram 20 MG tablet  Also known as:  celeXA  INSTRUCTIONS:  Take 1 tablet (20 mg total) by mouth daily.        omega-3 acid ethyl esters 1 gram capsule  Also known as:  LOVAZA  INSTRUCTIONS:  TAKE ONE CAPSULE BY MOUTH EVERY DAY              Where to Get Your Medications      These medications were sent to [facility] [address] [address]    Phone:  [phone]     citalopram 20 MG tablet         Additional Screenings Completed Today:

## 2021-09-12 ENCOUNTER — HEALTH MAINTENANCE LETTER (OUTPATIENT)
Age: 26
End: 2021-09-12

## 2022-10-30 ENCOUNTER — HEALTH MAINTENANCE LETTER (OUTPATIENT)
Age: 27
End: 2022-10-30

## 2023-11-12 ENCOUNTER — HEALTH MAINTENANCE LETTER (OUTPATIENT)
Age: 28
End: 2023-11-12

## 2023-11-14 ENCOUNTER — OFFICE VISIT (OUTPATIENT)
Dept: URGENT CARE | Facility: URGENT CARE | Age: 28
End: 2023-11-14
Payer: COMMERCIAL

## 2023-11-14 VITALS
HEART RATE: 77 BPM | TEMPERATURE: 98.7 F | OXYGEN SATURATION: 99 % | SYSTOLIC BLOOD PRESSURE: 115 MMHG | RESPIRATION RATE: 16 BRPM | WEIGHT: 206 LBS | BODY MASS INDEX: 35.64 KG/M2 | DIASTOLIC BLOOD PRESSURE: 80 MMHG

## 2023-11-14 DIAGNOSIS — R07.0 THROAT PAIN: Primary | ICD-10-CM

## 2023-11-14 LAB
DEPRECATED S PYO AG THROAT QL EIA: NEGATIVE
GROUP A STREP BY PCR: NOT DETECTED

## 2023-11-14 PROCEDURE — 99203 OFFICE O/P NEW LOW 30 MIN: CPT | Performed by: PHYSICIAN ASSISTANT

## 2023-11-14 PROCEDURE — 87651 STREP A DNA AMP PROBE: CPT | Performed by: PHYSICIAN ASSISTANT

## 2023-11-15 NOTE — PROGRESS NOTES
Assessment & Plan     Throat pain  Rapid strep negative, PCR pending. This is likely viral. Continue with supportive care. Get plenty of rest and push fluids. Can use Tylenol and/or ibuprofen as needed for pain and/or fever control. Discussed return to school/work guidelines. Return to clinic if symptoms worsen or do not improve; otherwise follow up as needed     - Streptococcus A Rapid Screen w/Reflex to PCR - Clinic Collect  - Group A Streptococcus PCR Throat Swab                 Return in about 1 week (around 11/21/2023), or if symptoms worsen or fail to improve.    MATTHEW Goldberg [facility] [place]              Subjective   Chief Complaint   Patient presents with    Throat Pain     X 1 day       HPI     URI Adult    Onset of symptoms was 1 day(s) ago.  Course of illness is same.    Severity moderate  Current and Associated symptoms: sore throat   Treatment measures tried include Tylenol/Ibuprofen.  Predisposing factors include None.                  Review of Systems         Objective    /80   Pulse 77   Temp 98.7  F (37.1  C) (Tympanic)   Resp 16   Wt 93.4 kg (206 lb)   LMP 11/06/2023 (Approximate)   SpO2 99%   BMI 35.64 kg/m    Body mass index is 35.64 kg/m .  Physical Exam  Constitutional:       Appearance: She is well-developed.   HENT:      Head: Normocephalic.      Right Ear: Tympanic membrane and ear canal normal.      Left Ear: Tympanic membrane and ear canal normal.      Mouth/Throat:      Pharynx: Posterior oropharyngeal erythema present.   Eyes:      Conjunctiva/sclera: Conjunctivae normal.   Cardiovascular:      Rate and Rhythm: Normal rate.      Heart sounds: Normal heart sounds.   Pulmonary:      Effort: Pulmonary effort is normal.      Breath sounds: Normal breath sounds.   Skin:     General: Skin is warm and dry.      Findings: No rash.   Psychiatric:         Behavior: Behavior normal.

## 2024-04-10 ENCOUNTER — VIRTUAL VISIT (OUTPATIENT)
Dept: OBGYN | Facility: CLINIC | Age: 29
End: 2024-04-10
Payer: COMMERCIAL

## 2024-04-10 DIAGNOSIS — Z34.00 ENCOUNTER FOR SUPERVISION PREGNANCY IN PRIMIGRAVIDA, ANTEPARTUM: Primary | ICD-10-CM

## 2024-04-10 PROCEDURE — 99207 PR NO CHARGE NURSE ONLY: CPT | Mod: 93

## 2024-04-10 NOTE — PROGRESS NOTES
Lifestyle and nutrition teaching completed   Karley Nsow OB Intake Nurse

## 2024-04-10 NOTE — PATIENT INSTRUCTIONS
Learning About Pregnancy  Your Care Instructions     Your health in the early weeks of your pregnancy is particularly important for your baby's health. Take good care of yourself. Anything you do that harms your body can also harm your baby.  Make sure to go to all of your doctor appointments. Regular checkups will help keep you and your baby healthy.  How can you care for yourself at home?  Diet    Choose healthy foods like fruits, vegetables, whole grains, lean proteins, and healthy fats.     Choose foods that are good sources of calcium, iron, and folate. You can try dairy products, dark leafy greens, fortified orange juice and cereals, almonds, broccoli, dried fruit, and beans.     Do not skip meals or go for many hours without eating. If you are nauseated, try to eat a small, healthy snack every 2 to 3 hours.     Avoid fish that are high in mercury. These include shark, swordfish, phoebe mackerel, marlin, orange roughy, and bigeye tuna, as well as tilefish from the [facility].     It's okay to eat up to 8 to 12 ounces a week of fish that are low in mercury or up to 4 ounces a week of fish that have medium levels of mercury. Some fish that are low in mercury are salmon, shrimp, canned light tuna, cod, and tilapia. Some fish that have medium levels of mercury are halibut and white albacore tuna.     Drink plenty of fluids. If you have kidney, heart, or liver disease and have to limit fluids, talk with your doctor before you increase the amount of fluids you drink.     Limit caffeine to about 200 to 300 mg per day. On average, a cup of brewed coffee has around 80 to 100 mg of caffeine.     Do not drink alcohol, such as beer, wine, or hard liquor.     Take a multivitamin that contains at least 400 micrograms (mcg) of folic acid to help prevent birth defects. Fortified cereal and whole wheat bread are good additional sources of folic acid.     Increase the calcium in your diet. Try to drink a quart of skim milk  each day. You may also take calcium supplements and choose foods such as cheese and yogurt.   Lifestyle    Make sure you go to your follow-up appointments.     Get plenty of rest. You may be unusually tired while you are pregnant.     Get at least 30 minutes of exercise on most days of the week. Walking is a good choice. If you have not exercised in the past, start out slowly. Take several short walks each day.     Do not smoke. If you need help quitting, talk to your doctor about stop-smoking programs. These can increase your chances of quitting for good.     Do not touch cat feces or litter boxes. Also, wash your hands after you handle raw meat, and fully cook all meat before you eat it. Wear gloves when you work in the yard or garden, and wash your hands well when you are done. Cat feces, raw or undercooked meat, and contaminated dirt can cause an infection that may harm your baby or lead to a miscarriage.     Avoid things that can make your body too hot and may be harmful to your baby, such as a hot tub or sauna. Or talk with your doctor before doing anything that raises your body temperature. Your doctor can tell you if it's safe.     Avoid chemical fumes, paint fumes, or poisons.     Do not use illegal drugs, marijuana, or alcohol.   Medicines    Review all of your medicines with your doctor. Some of your routine medicines may need to be changed to protect your baby.     Use acetaminophen (Tylenol) to relieve minor problems, such as a mild headache or backache or a mild fever with cold symptoms. Do not use nonsteroidal anti-inflammatory drugs (NSAIDs), such as ibuprofen (Advil, Motrin) or naproxen (Aleve), unless your doctor says it is okay.     Do not take two or more pain medicines at the same time unless the doctor told you to. Many pain medicines have acetaminophen, which is Tylenol. Too much acetaminophen (Tylenol) can be harmful.     Take your medicines exactly as prescribed. Call your doctor if you  "think you are having a problem with your medicine.   To manage morning sickness    Keep food in your stomach, but not too much at once. Try eating five or six small meals a day instead of three large meals.     For nausea when you wake up, eat a small snack, such as a couple of crackers or pretzels, before rising. Allow a few minutes for your stomach to settle before you slowly get up.     Try to avoid smells and foods that make you feel nauseated. High-fat or greasy foods, milk, and coffee may make nausea worse. Some foods that may be easier to tolerate include cold, spicy, sour, and salty foods.     Drink enough fluids. Water and other caffeine-free drinks are good choices.     Take your prenatal vitamins at night on a full stomach.     Try foods and drinks made with isabel. Isabel may help with nausea.     Get lots of rest. Morning sickness may be worse when you are tired.     Talk to your doctor about over-the-counter products, such as vitamin B6 or doxylamine, to help relieve symptoms.     Try a P6 acupressure wrist band. These anti-nausea wristbands help some people.   Follow-up care is a key part of your treatment and safety. Be sure to make and go to all appointments, and call your doctor if you are having problems. It's also a good idea to know your test results and keep a list of the medicines you take.  Where can you learn more?  Go to https://www.[email or web address].net/patiented  Enter E868 in the search box to learn more about \"Learning About Pregnancy.\"  Current as of: July 10, 2023               Content Version: 14.0    4365-0589 [employer].   Care instructions adapted under license by your healthcare professional. If you have questions about a medical condition or this instruction, always ask your healthcare professional. [employer] disclaims any warranty or liability for your use of this information.      Weeks 6 to 10 of Your Pregnancy: Care Instructions  During these weeks of " "pregnancy, your body goes through many changes. You may start to feel different, both in your body and your emotions. Each pregnancy is different, so there's no \"right\" way to feel. These early weeks are a time to make healthy choices for you and your pregnancy.    Take a daily prenatal vitamin. Choose one with folic acid in it.    Avoid alcohol, tobacco, and drugs (including marijuana). If you need help quitting, talk to your doctor.    Drink plenty of liquids.  Be sure to drink enough water. And limit sodas, other sweetened drinks, and caffeine.     Choose foods that are good sources of calcium, iron, and folate.  You can try dairy products, dark leafy greens, fortified orange juice and cereals, almonds, broccoli, dried fruit, and beans.     Avoid foods that may be harmful.  Don't eat raw meat, deli meat, raw seafood, or raw eggs. Avoid soft cheese and unpasteurized dairy, like Brie and blue cheese. And don't eat fish that contains a lot of mercury, like shark and swordfish.     Don't touch roseline litter or cat poop.  They can cause an infection that could be harmful during pregnancy.     Avoid things that can make your body too hot.  For example, avoid hot tubs and saunas.     Soothe morning sickness.  Try eating 5 or 6 small meals a day, getting some fresh air, or using ajith to control symptoms.     Ask your doctor about flu and COVID-19 shots.  Getting them can help protect against infection.   Follow-up care is a key part of your treatment and safety. Be sure to make and go to all appointments, and call your doctor if you are having problems. It's also a good idea to know your test results and keep a list of the medicines you take.  Where can you learn more?  Go to https://www.Exterity.net/patiented  Enter G112 in the search box to learn more about \"Weeks 6 to 10 of Your Pregnancy: Care Instructions.\"  Current as of: July 10, 2023               Content Version: 14.0    3864-7719 [employer]. "   Care instructions adapted under license by your healthcare professional. If you have questions about a medical condition or this instruction, always ask your healthcare professional. [employer] disclaims any warranty or liability for your use of this information.         Managing Morning Sickness (01:55)  Your health professional recommends that you watch this short online health video.  Learn tips for dealing with morning sickness, no matter what time of day you have it.  Purpose:  Gives tips for managing morning sickness, including eating small low-fat meals and avoiding caffeine and spicy food.  Goal:  The user will learn tips for dealing with morning sickness during pregnancy.     How to watch the video    Scan the QR code   OR Visit the website    [email or web address]   Current as of: July 10, 2023               Content Version: 14.0    7402-6649 [employer].   Care instructions adapted under license by your healthcare professional. If you have questions about a medical condition or this instruction, always ask your healthcare professional. [employer] disclaims any warranty or liability for your use of this information.      Pregnancy and Heartburn: Care Instructions  Overview     Heartburn is a common problem during pregnancy.  Heartburn happens when stomach acid backs up into the tube that carries food to the stomach. This tube is called the esophagus. Early in pregnancy, heartburn is caused by hormone changes that slow down digestion. Later on, it's also caused by the large uterus pushing up on the stomach.  Even though you can't fix the cause, there are things you can do to get relief. Treating heartburn during pregnancy focuses first on making lifestyle changes, like changing what and how you eat, and on taking medicines.  Heartburn usually improves or goes away after childbirth.  Follow-up care is a key part of your treatment and safety. Be sure to make  "and go to all appointments, and call your doctor if you are having problems. It's also a good idea to know your test results and keep a list of the medicines you take.  How can you care for yourself at home?  Eat small, frequent meals.  Avoid foods that make your symptoms worse, such as chocolate, peppermint, and spicy foods. Avoid drinks with caffeine, such as coffee, tea, and sodas.  Avoid bending over or lying down after meals.  Take a short walk after you eat.  If heartburn is a problem at night, do not eat for 2 hours before bedtime.  Take antacids like Mylanta, Maalox, Rolaids, or Tums. Do not take antacids that have sodium bicarbonate, magnesium trisilicate, or aspirin. Be careful when you take over-the-counter antacid medicines. Many of these medicines have aspirin in them. While you are pregnant, do not take aspirin or medicines that contain aspirin unless your doctor says it is okay.  If you're not getting relief, talk to your doctor. You may be able to take a stronger acid-reducing medicine.  When should you call for help?   Call your doctor now or seek immediate medical care if:    You have new or worse belly pain.     You are vomiting.   Watch closely for changes in your health, and be sure to contact your doctor if:    You have new or worse symptoms of reflux.     You are losing weight.     You have trouble or pain swallowing.     You do not get better as expected.   Where can you learn more?  Go to https://www.[email or web address].net/patiented  Enter U946 in the search box to learn more about \"Pregnancy and Heartburn: Care Instructions.\"  Current as of: July 10, 2023               Content Version: 14.0    5692-9486 [employer].   Care instructions adapted under license by your healthcare professional. If you have questions about a medical condition or this instruction, always ask your healthcare professional. [employer] disclaims any warranty or liability for your use of this " information.      Constipation: Care Instructions  Overview     Constipation means that you have a hard time passing stools (bowel movements). People pass stools from 3 times a day to once every 3 days. What is normal for you may be different. Constipation may occur with pain in the rectum and cramping. The pain may get worse when you try to pass stools. Sometimes there are small amounts of bright red blood on toilet paper or the surface of stools. This is because of enlarged veins near the rectum (hemorrhoids).  A few changes in your diet and lifestyle may help you avoid ongoing constipation. Your doctor may also prescribe medicine to help loosen your stool.  Some medicines can cause constipation. These include pain medicines and antidepressants. Tell your doctor about all the medicines you take. Your doctor may want to make a medicine change to ease your symptoms.  Follow-up care is a key part of your treatment and safety. Be sure to make and go to all appointments, and call your doctor if you are having problems. It's also a good idea to know your test results and keep a list of the medicines you take.  How can you care for yourself at home?  Drink plenty of fluids. If you have kidney, heart, or liver disease and have to limit fluids, talk with your doctor before you increase the amount of fluids you drink.  Include high-fiber foods in your diet each day. These include fruits, vegetables, beans, and whole grains.  Get at least 30 minutes of exercise on most days of the week. Walking is a good choice. You also may want to do other activities, such as running, swimming, cycling, or playing tennis or team sports.  Take a fiber supplement, such as Citrucel or Metamucil, every day. Read and follow all instructions on the label.  Schedule time each day for a bowel movement. A daily routine may help. Take your time having a bowel movement, but don't sit for more than 10 minutes at a time. And don't strain too  "much.  Support your feet with a small step stool when you sit on the toilet. This helps flex your hips and places your pelvis in a squatting position.  Your doctor may recommend an over-the-counter laxative to relieve your constipation. Examples are Milk of Magnesia and MiraLax. Read and follow all instructions on the label. Do not use laxatives on a long-term basis.  When should you call for help?   Call your doctor now or seek immediate medical care if:    You have new or worse belly pain.     You have new or worse nausea or vomiting.     You have blood in your stools.   Watch closely for changes in your health, and be sure to contact your doctor if:    Your constipation is getting worse.     You do not get better as expected.   Where can you learn more?  Go to [email or web address]/[email or web address]  Enter P343 in the search box to learn more about \"Constipation: Care Instructions.\"  Current as of: October 19, 2023               Content Version: 14.0    1289-1107 [employer].   Care instructions adapted under license by your healthcare professional. If you have questions about a medical condition or this instruction, always ask your healthcare professional. [employer] disclaims any warranty or liability for your use of this information.      Learning About High-Iron Foods  What foods are high in iron?     The foods you eat contain nutrients, such as vitamins and minerals. Iron is a nutrient. Your body needs the right amount to stay healthy and work as it should. You can use the list below to help you make choices about which foods to eat.  Here are some foods that contain iron. They have 1 to 2 milligrams of iron per serving.  Fruits  Figs (dried), 5 figs  Vegetables  Asparagus (canned), 6 kincaid  Nu, beet, Swiss chard, or turnip greens, 1 cup  Dried peas, cooked,   cup  Seaweed, spirulina (dried),   cup  Spinach, (cooked)   cup or (raw) 1 cup  Grains  Cereals, fortified with iron, 1 " "cup  Grits (instant, cooked), fortified with iron,   cup  Meats and other protein foods  Beans (kidney, lima, navy, white), canned or cooked,   cup  Beef or lamb, 3 oz  Chicken giblets, 3 oz  Chickpeas (garbanzo beans),   cup  Liver of beef, lamb, or pork, 3 oz  Oysters (cooked), 3 oz  Sardines (canned), 3 oz  Soybeans (boiled),   cup  Tofu (firm),   cup  Work with your doctor to find out how much of this nutrient you need. Depending on your health, you may need more or less of it in your diet.  Where can you learn more?  Go to https://www.BeeTV.net/patiented  Enter R005 in the search box to learn more about \"Learning About High-Iron Foods.\"  Current as of: September 20, 2023               Content Version: 14.0    3346-8392 [employer].   Care instructions adapted under license by your healthcare professional. If you have questions about a medical condition or this instruction, always ask your healthcare professional. [employer] disclaims any warranty or liability for your use of this information.      Rh Antibodies Screening During Pregnancy: About This Test  What is it?     The Rh antibodies screening test is a blood test. It checks your blood for Rh antibodies. If you have Rh-negative blood and have been exposed to Rh-positive blood, your immune system may make antibodies to attack the Rh-positive blood. When a pregnant woman has these antibodies, it is called Rh sensitization.  Why is this test done?  The Rh antibodies screening test is done during pregnancy to find out if your baby is at risk for Rh disease. This can happen if you have Rh-negative blood and your baby has Rh-positive blood. If your Rh-negative blood mixes with Rh-positive blood, your immune system will make antibodies to attack the Rh-positive blood.  During pregnancy, these antibodies could attach to the baby's red blood cells. This can cause your baby to have serious health problems. The results of this test " "will help your doctor know how to best care for you and your baby during your pregnancy.  How do you prepare for the test?  In general, there's nothing you have to do before this test, unless your doctor tells you to.  How is the test done?  A health professional uses a needle to take a blood sample, usually from the arm.  What happens after the test?  You will probably be able to go home right away. It depends on the reason for the test.  You can go back to your usual activities right away.  Follow-up care is a key part of your treatment and safety. Be sure to make and go to all appointments, and call your doctor if you are having problems. It's also a good idea to keep a list of the medicines you take. Ask your doctor when you can expect to have your test results.  Where can you learn more?  Go to https://[email or web address].Eyesquad/[email or web address]  Enter P722 in the search box to learn more about \"Rh Antibodies Screening During Pregnancy: About This Test.\"  Current as of: July 10, 2023               Content Version: 14.0    2849-9497 [employer].   Care instructions adapted under license by your healthcare professional. If you have questions about a medical condition or this instruction, always ask your healthcare professional. [employer] disclaims any warranty or liability for your use of this information.      Learning About Preventing Rh Disease  What is Rh disease?     Rh disease can be a serious problem in pregnancy. It happens when substances called antibodies in the mother's blood cause red blood cells in her baby's blood to be destroyed. This can occur when the blood types of a mother and her baby do not match.  All blood has an Rh factor. This is what makes a blood type positive or negative. When you are Rh-negative, your baby may be Rh-negative or Rh-positive. If your baby has Rh-positive blood and it mixes with yours, your body will make antibodies. This is called Rh sensitization.  Most of " "the time, this is not a problem in a first pregnancy. But in future pregnancies, it could cause Rh disease.  A  with Rh disease has mild anemia and may have jaundice. In severe cases, anemia, jaundice, and swelling can be very dangerous or fatal. Some babies need to be delivered early. Some need special care in the NICU. A very sick baby will need a blood transfusion before or after birth.  Fortunately, Rh sensitization is usually easy to prevent.  That's why it's important to get your Rh status checked in your first trimester. It doesn't cause any warning signs. A blood test is the only way to know if you are Rh-sensitive or are at risk for it.  How can you prevent Rh disease?  If you are Rh-negative, your doctor gives you an Rh immune globulin shot (such as RhoGAM). It helps prevent your body from making the antibodies that attack your baby's red blood cells.  Timing is important. You need the shot at certain times during your pregnancy. And you need one anytime there is a chance that your baby's blood might mix with yours. That can happen with certain prenatal tests or when you have pregnancy bleeding, such as:  Right after any pregnancy loss, amniocentesis, or CVS testing.  After turning of a breech baby.  Before and maybe after childbirth. Your doctor gives you a shot around week 28. If your  is Rh-positive, you will have another shot.  Follow-up care is a key part of your treatment and safety. Be sure to make and go to all appointments, and call your doctor if you are having problems. It's also a good idea to know your test results and keep a list of the medicines you take.  Where can you learn more?  Go to https://www.MediSapiens.net/patiented  Enter W177 in the search box to learn more about \"Learning About Preventing Rh Disease.\"  Current as of: July 10, 2023               Content Version: 14.0    9561-3834 [employer].   Care instructions adapted under license by [facility] healthcare " professional. If you have questions about a medical condition or this instruction, always ask your healthcare professional. [employer], [facility] disclaims any warranty or liability for your use of this information.      Learning About Rh Immunoglobulin Shots  Introduction     An Rh immunoglobulin shot is given to pregnant women who have Rh-negative blood.  You may have Rh-negative blood, and your baby may have Rh-positive blood. If the two types of blood mix, your body will make antibodies. This is called Rh sensitization. Most of the time, this is not a problem the first time you're pregnant. But it could cause problems in future pregnancies.  This shot keeps your body from making the antibodies. You get the shot around 28 weeks of pregnancy. After the birth, your baby's blood is tested. If the blood is Rh positive, you will get another shot. You may also get the shot if you have vaginal bleeding while you are pregnant or if you have a miscarriage. These shots protect future pregnancies.  Women with Rh negative blood will need this shot each time they get pregnant.  Example  Rh immunoglobulin (HypRho-D, MICRhoGAM, and RhoGAM)  Possible side effects  Rare side effects may include:  Some mild pain where you got the shot.  A slight fever.  An allergic reaction.  You may have other side effects not listed here. Check the information that comes with your medicine.  What to know about taking this medicine  You may need more than one shot. You may need the shot again:  After amniocentesis, fetal blood sampling, or chorionic villus sampling tests.  If you have bleeding in your second or third trimester.  After turning of a breech baby.  After an injury to the belly while you are pregnant.  After a miscarriage or an .  Before or right after treatment for an ectopic or a partial molar pregnancy.  Tell your doctor if you have any allergies or have had a bad response to medicines in the past.  If you get this shot  "within 3 months of getting a live-virus vaccine, the vaccine may not work. Your doctor will tell you if you need more vaccine.  Check with your doctor or pharmacist before you use any other medicines. This includes over-the-counter medicines. Make sure your doctor knows all of the medicines, vitamins, herbs, and supplements you take. Taking some medicines at the same time can cause problems.  Where can you learn more?  Go to [email or web address]  Enter V615 in the search box to learn more about \"Learning About Rh Immunoglobulin Shots.\"  Current as of: July 10, 2023               Content Version: 14.0    9495-5758 [employer].   Care instructions adapted under license by your healthcare professional. If you have questions about a medical condition or this instruction, always ask your healthcare professional. [employer] disclaims any warranty or liability for your use of this information.      Rubella ([place] Measles): Care Instructions  Overview  Rubella, also called [place] measles or 3-day measles, is a disease caused by a virus. It spreads by coughs, sneezes, and close contact. Rubella usually is mild and does not cause long-term problems. But if you are pregnant and get it, you can give the disease to your unborn baby. This can cause serious birth defects.  While you have rubella, you may get a rash and a mild fever, and the lymph glands in your neck may swell. Older children often have a fever, eye pain, a sore throat, and body aches. You can relieve most symptoms with care at home. Avoid being around others, especially pregnant people, until your rash has been gone for at least 4 days. People who have not had this disease before or have not had the vaccine have the greatest chance of getting the virus.  Follow-up care is a key part of your treatment and safety. Be sure to make and go to all appointments, and call your doctor if you are having problems. It's also a good " "idea to know your test results and keep a list of the medicines you take.  How can you care for yourself at home?  Drink plenty of fluids. If you have kidney, heart, or liver disease and have to limit fluids, talk with your doctor before you increase the amount of fluids you drink.  Get plenty of rest to help your body heal.  Take an over-the-counter pain medicine, such as acetaminophen (Tylenol), ibuprofen (Advil, Motrin), or naproxen (Aleve), to reduce fever and discomfort. Read and follow all instructions on the label. Do not give aspirin to anyone younger than 20. It has been linked to Reye syndrome, a serious illness.  Do not take two or more pain medicines at the same time unless the doctor told you to. Many pain medicines have acetaminophen, which is Tylenol. Too much acetaminophen (Tylenol) can be harmful.  Try not to scratch the rash. Put cold, wet cloths on the rash to reduce itching.  Do not smoke. Smoking can make your symptoms worse. If you need help quitting, talk to your doctor about stop-smoking programs and medicines. These can increase your chances of quitting for good.  Avoid contact with people who have never had rubella and who have not been immunized.  When should you call for help?   Call your doctor now or seek immediate medical care if:    You have a fever with a stiff neck or a severe headache.     You are sensitive to light or feel very sleepy or confused.   Watch closely for changes in your health, and be sure to contact your doctor if:    You do not get better as expected.   Where can you learn more?  Go to https://www.classmarkets.net/patiented  Enter B812 in the search box to learn more about \"Rubella ([place] Measles): Care Instructions.\"  Current as of: June 12, 2023               Content Version: 14.0    9343-5596 [employer].   Care instructions adapted under license by your healthcare professional. If you have questions about a medical condition or this instruction, " always ask your healthcare professional. Healthwise, [facility] disclaims any warranty or liability for your use of this information.      Gonorrhea and Chlamydia: About These Tests  What is it?  These tests use a sample of urine or other body fluid to look for the bacteria that cause these sexually transmitted infections (STIs). The fluid sample can come from the cervix, vagina, rectum, throat, or eyes.  Why is this test done?  These tests may be done to:  Find out if symptoms are caused by gonorrhea or chlamydia.  Check people who are at high risk of being infected with gonorrhea or chlamydia.  Retest people several months after they have been treated for gonorrhea or chlamydia.  Check for infection in your  if you had a gonorrhea or chlamydia infection at the time of delivery.  How can you prepare for the test?  If you are going to have a urine test, do not urinate for at least 1 hour before the test.  If you think you may have chlamydia or gonorrhea, don't have sexual intercourse until you get your test results. And you may want to have tests for other STIs, such as HIV.  How is the test done?  For a direct sample, a swab is used to collect body fluid from the cervix, vagina, rectum, throat, or eyes. Your doctor may collect the sample. Or you may be given instructions on how to collect your own sample.  For a urine sample, you will collect the urine that comes out when you first start to urinate. Don't wipe the genital area clean before you urinate.  How long does the test take?  The test will take a few minutes.  What happens after the test?  You will be able to go home right away.  You can go back to your usual activities right away.  If you do have an infection, don't have sexual intercourse for 7 days after you start treatment. And your sex partner(s) should also be treated.  Follow-up care is a key part of your treatment and safety. Be sure to make and go to all appointments, and call your doctor  "if you are having problems. It's also a good idea to keep a list of the medicines you take. Ask your doctor when you can expect to have your test results.  Where can you learn more?  Go to https://www.[email or web address].net/patiented  Enter K976 in the search box to learn more about \"Gonorrhea and Chlamydia: About These Tests.\"  Current as of: November 27, 2023               Content Version: 14.0    5609-3637 [employer].   Care instructions adapted under license by your healthcare professional. If you have questions about a medical condition or this instruction, always ask your healthcare professional. [employer] disclaims any warranty or liability for your use of this information.      Trichomoniasis: About This Test  What is it?     This test uses a sample of urine or other body fluid to look for the tiny parasite that causes trichomoniasis (also called trich). The fluid sample can come from the vagina, cervix, or urethra. Your doctor may choose to use one or more of many available tests.  Why is it done?  A trich test may be done to:  Find out if symptoms are caused by trich.  Check people who are at high risk for being infected with trich.  Check after treatment to make sure that the infection is gone.  How do you prepare for the test?  If you are going to have a urine test, do not urinate for at least 1 hour before the test.  How is the test done?  For a direct sample, a swab is used to collect body fluid from the cervix, vagina, or urethra. Your doctor may collect the sample. Or you may be given instructions on how to collect your own sample.  For a urine sample, you will collect the urine that comes out when you first start to urinate. Don't wipe the area clean before you urinate.  How long does the test take?  It will take a few minutes to collect a sample.  What happens after the test?  You can go home right away.  You can go back to your usual activities right away.  You may get the " test results the same day or several days later. It depends on the test used.  If you do have an infection, don't have sexual intercourse for 7 days after you start treatment. Your sex partner or partners should also be treated.  Follow-up care is a key part of your treatment and safety. Be sure to make and go to all appointments, and call your doctor if you are having problems. Ask your doctor when you can expect to have your test results.  Current as of: November 27, 2023               Content Version: 14.0    1429-3189 [employer].   Care instructions adapted under license by your healthcare professional. If you have questions about a medical condition or this instruction, always ask your healthcare professional. [employer] disclaims any warranty or liability for your use of this information.      HIV Testing: Care Instructions  Overview  You can get tested for the human immunodeficiency virus (HIV). Most doctors use a blood test to check for HIV antibodies and antigens in your blood. It may also check for the genetic material (RNA) of HIV. Some tests use saliva to check for HIV antibodies. But these aren't as accurate. For example, they may give a false result if you've just been infected.  What do the results mean?    Normal (negative)    No HIV antibodies, antigens, or RNA were found.  You may need more testing. It can make sure your test results are correct.    Uncertain (indeterminate)    Test results didn't clearly show if you have an HIV infection.  HIV antibodies or antigens may not have formed yet.  Some other type of antibody or antigen may have affected the results.  You will need another test to be sure.    Abnormal (positive)    HIV antibodies, antigens, or RNA were found.  If you haven't had an RNA test yet, one will be done. If it's positive, you have HIV.  If your test result is positive, your doctor will talk to you. You will discuss starting treatment.  Follow-up care  "is a key part of your treatment and safety. Be sure to make and go to all appointments, and call your doctor if you are having problems. It's also a good idea to know your test results and keep a list of the medicines you take.  Where can you learn more?  Go to https://www.OKKAM.net/patiented  Enter T792 in the search box to learn more about \"HIV Testing: Care Instructions.\"  Current as of: June 12, 2023               Content Version: 14.0    6052-6435 [employer].   Care instructions adapted under license by your healthcare professional. If you have questions about a medical condition or this instruction, always ask your healthcare professional. [employer] disclaims any warranty or liability for your use of this information.      Hepatitis C Virus Tests: About These Tests  What are they?     Hepatitis C virus tests are blood tests that check for substances in the blood that show whether you have hepatitis C now or had it in the past. The tests can also tell you what type of hepatitis C you have and how severe the disease is. This can help your doctor with treatment.  If the tests show that you have long-term hepatitis C, you need to take steps to prevent spreading the disease.  Why are these tests done?  You may need these tests if:  You have symptoms of hepatitis.  You may have been exposed to the virus. You are more likely to have been exposed to the virus if you inject drugs or are exposed to body fluids (such as if you are a health care worker).  You've had other tests that show you have liver problems.  You are 18 to 79 years old.  You have an HIV infection.  The tests also are done to help your doctor decide about your treatment and see how well it works.  How do you prepare for the test?  In general, there's nothing you have to do before this test, unless your doctor tells you to.  How is the test done?  A health professional uses a needle to take a blood sample, usually from " "the arm.  What happens after these tests?  You will probably be able to go home right away.  You can go back to your usual activities right away.  Follow-up care is a key part of your treatment and safety. Be sure to make and go to all appointments, and call your doctor if you are having problems. It's also a good idea to keep a list of the medicines you take. Ask your doctor when you can expect to have your test results.  Where can you learn more?  Go to https://www.[employer]isar.net/patiented  Enter W551 in the search box to learn more about \"Hepatitis C Virus Tests: About These Tests.\"  Current as of: June 12, 2023               Content Version: 14.0    8709-3248 [employer].   Care instructions adapted under license by your healthcare professional. If you have questions about a medical condition or this instruction, always ask your healthcare professional. [employer] disclaims any warranty or liability for your use of this information.      Learning About Fetal Ultrasound Results  What is a fetal ultrasound?     Fetal ultrasound is a test that lets your doctor see an image of your baby. Your doctor learns information about your baby from this picture. You may find out, for example, if you are having a boy or a girl. But the main reason you have this test is to get information about your baby's health.  (You may hear your baby called a fetus. This is a common medical term for a baby that's growing in the mother's uterus.)  What kind of information can you learn from this test?  The findings of an ultrasound fall into two categories, normal and abnormal.  Normal  The fetus is the right size for its age.  The placenta is the expected size and does not cover the cervix.  There is enough amniotic fluid in the uterus.  No birth defects can be seen.  Abnormal  The fetus is small or large for its age.  The placenta covers the cervix.  There is too much or too little amniotic fluid in the " uterus.  The fetus may have a birth defect.  What does an abnormal result mean?  Abnormal seems to imply that something is wrong with your baby. But what it means is that the test has shown something the doctor wants to take a closer look at.  And that's what happens next. Your doctor will talk to you about what further test or tests you may need.  What do the results mean?  Some of the things your doctor may see on an abnormal ultrasound include:  Echogenic bowel.  The bowel looks very bright on the screen. This could mean that there's blood in the bowel. Or it could mean that something is blocking the small bowel.  Increased nuchal translucency.  The ultrasound measures the thickness at the back of the baby's neck. An increase in thickness is sometimes an early sign of Down syndrome.  Increased or decreased amniotic fluid.  The doctor will look for a reason for the level of amniotic fluid and will watch the pregnancy closely as it progresses.  Large ventricles.  Ventricles in the brain look larger than they should. Your doctor may take a closer look at the brain.  Renal pyelectasis/hydronephrosis.  The ultrasound measures the fluid around the kidney. If there is more fluid than expected, there is a chance of urinary tract or kidney problems.  Short long bones.  The ultrasound measures certain arm and leg bones. A long bone (humerus or femur) that is shorter than average could be a sign of Down syndrome.  Subchorionic hemorrhage.  An ultrasound can show bleeding under one of the membranes that surrounds the fetus. Some women don't have symptoms of bleeding. The ultrasound can find this problem when women are not bleeding from their vagina. Women who have this condition have a slightly higher chance of miscarriage.  What do you do now?  Take a deep breath, and let it out. Keep in mind that an abnormal finding on an ultrasound, after it's coupled with more information, may:  Turn out to be nothing.  Turn out to be  "something mild that won't affect the baby.  Turn out to be something more serious. But if this happens, early diagnosis helps you and your doctor plan treatment options sooner rather than later.  Your medical team is there for you. So are your family and friends. Ask questions, and get the help and support you need.  Follow-up care is a key part of your treatment and safety. Be sure to make and go to all appointments, and call your doctor if you are having problems. It's also a good idea to know your test results and keep a list of the medicines you take.  Where can you learn more?  Go to https://www.[employer].net/patiented  Enter K451 in the search box to learn more about \"Learning About Fetal Ultrasound Results.\"  Current as of: July 10, 2023               Content Version: 14.0    4608-4701 [employer].   Care instructions adapted under license by your healthcare professional. If you have questions about a medical condition or this instruction, always ask your healthcare professional. [employer] disclaims any warranty or liability for your use of this information.      Learning About Prenatal Visits  Overview     Regular prenatal visits are very important during any pregnancy. These quick office visits may seem simple and routine. But they can help you have a safe and healthy pregnancy. Your doctor is watching for problems that can only be found through regular checkups. The visits also give you and your doctor time to build a good relationship.  After your first visit, you will most likely start on a schedule of monthly visits. In your third trimester, the visits will get more frequent. Based on your health, your age, and if you've had a normal, full-term pregnancy before, your doctor may want to see you more or less often.  At different times in your pregnancy, you will have exams and tests. Some are routine. Others are done only when there is a chance of a problem. Everything healthy " you do for your body helps you have a healthy pregnancy. Rest when you need it. Eat well, drink plenty of water, and exercise regularly.  What happens during a prenatal visit?  You will have blood pressure checks, along with urine tests. You also may have blood tests. If you need to go to the bathroom while waiting for the doctor, tell the nurse. You will be given a sample cup so your urine can be tested.  You will be weighed and have your belly measured.  Your doctor may listen to the fetal heartbeat with a special device.  At about 24 weeks, and possibly earlier in your pregnancy, your doctor will check your blood sugar (glucose tolerance test) for diabetes that can occur during pregnancy. This is gestational diabetes, which can be harmful.  You will have tests to check for infections that could harm your . These include group B streptococcus and hepatitis B.  Your doctor may do ultrasounds to check for problems. This also checks the position of the fetus. An ultrasound uses sound waves to produce a picture of the fetus.  You may get your vaccines updated.  Your doctor may ask you questions to check for signs of anxiety or depression. Tell your doctor if you feel sad, anxious, or hopeless for more than a few days.  You may have other tests at any time during your pregnancy.  Use your visits to discuss with your doctor any concerns you have.  How can you care for yourself at home?  Get plenty of rest.  Try to exercise every day, if your doctor says it is okay. If you have not exercised in the past, start out slowly. For example, you can take short walks each day.  Choose healthy foods, such as fruits, vegetables, whole grains, lean proteins, low-fat dairy, and healthy fats.  Drink plenty of fluids. Cut down on drinks with caffeine, such as coffee, tea, and cola. If you have kidney, heart, or liver disease and have to limit fluids, talk with your doctor before you increase the amount of fluids you drink.  Try  "to avoid chemical fumes, paint fumes, and poisons.  If you smoke, vape, or use alcohol, marijuana, or other drugs, quit or cut back as much as you can. Talk to your doctor if you need help quitting.  Review all of your medicines, including over-the-counter medicines and supplements, with your doctor. Some of your routine medicines may need to be changed. Do not stop or start taking any medicines without talking to your doctor first.  Follow-up care is a key part of your treatment and safety. Be sure to make and go to all appointments, and call your doctor if you are having problems. It's also a good idea to know your test results and keep a list of the medicines you take.  Where can you learn more?  Go to https://www.Vouchr.net/patiented  Enter J502 in the search box to learn more about \"Learning About Prenatal Visits.\"  Current as of: July 10, 2023               Content Version: 14.0    9510-9004 [employer].   Care instructions adapted under license by your healthcare professional. If you have questions about a medical condition or this instruction, always ask your healthcare professional. [employer] disclaims any warranty or liability for your use of this information.      Intimate Partner Violence: Care Instructions  Overview     If you want to save this information but don't think it is safe to take it home, see if a trusted friend can keep it for you. Plan ahead. Know who you can call for help, and memorize the phone number.   Be careful online too. Your online activity may be seen by others. Do not use your personal computer or device to read about this topic. Use a safe computer, such as one at work, a friend's home, or a library.    Intimate partner violence--a type of domestic abuse--is different from an argument now and then. It is a pattern of abuse that one person may use to control another person's behavior. It may start with threats and name-calling. Then, it may lead to " more serious acts, like pushing and slapping. The abuse also may occur in other areas. For example, the abuser may withhold money or spend a partner's money without their knowledge.  Abuse can cause serious harm. You are more likely to have a long-term health problem from the injuries and stress of living in a violent relationship. People who are sexually abused by their partners have more sexually transmitted infections and unplanned pregnancies. Anyone who is abused also faces emotional pain. Anyone can be abused in relationships. In some relationships, both people use abusive behavior.  If you are pregnant, abuse can cause problems such as poor weight gain, infections, and bleeding. Abuse during this time may increase your baby's risk of low birth weight, premature birth, and death.  Follow-up care is a key part of your treatment and safety. Be sure to make and go to all appointments, and call your doctor if you are having problems. It's also a good idea to know your test results and keep a list of the medicines you take.  How can you care for yourself at home?  If you do not have a safe place to stay, discuss this with your doctor before you leave.  Have a plan for where to go, how to leave your home, and where to stay in case of an emergency. Do not tell your partner about your plan. Contact:  The National Domestic Violence Hotline toll-free at 1-396.375.8984. They can help you find resources in your area.  Your local police department, hospital, or clinic for information about shelters and safe homes near you.  Talk to a trusted friend or neighbor, a counselor, or a jonas leader. Do not feel that you have to hide what happened.  Teach your children how to call for help in an emergency.  Be alert to warning signs, such as threats, heavy alcohol use, or drug use. This can help you avoid danger.  If you can, make sure that there are no guns or other weapons in your home.  When should you call for help?   Call 118  "anytime you think you may need emergency care. For example, call if:    You or someone else has just been abused.     You think you or someone else is in danger of being abused.   Watch closely for changes in your health, and be sure to contact your doctor if you have any problems.  Where can you learn more?  Go to https://www.SDNsquare.net/patiented  Enter G282 in the search box to learn more about \"Intimate Partner Violence: Care Instructions.\"  Current as of: June 24, 2023               Content Version: 14.0    1330-0617 [employer].   Care instructions adapted under license by your healthcare professional. If you have questions about a medical condition or this instruction, always ask your healthcare professional. [employer] disclaims any warranty or liability for your use of this information.      Intimate Partner Violence Safety Instructions: Care Instructions  Overview     If you want to save this information but don't think it is safe to take it home, see if a trusted friend can keep it for you. Plan ahead. Know who you can call for help, and memorize the phone number.   Be careful online too. Your online activity may be seen by others. Do not use your personal computer or device to read about this topic. Use a safe computer, such as one at work, a friend's home, or a library.    When you are abused by a spouse or partner, you can take actions to protect yourself and your children.  You can increase your safety whether you decide to stay with your spouse or partner or you decide to leave. You may want to make a safety plan and pack a bag ahead of time. This will help you leave quickly when you decide to. Remember, you cannot change your partner's actions, but you can find help for you and your children. No one deserves to be abused.  Follow-up care is a key part of your treatment and safety. Be sure to make and go to all appointments, and call your doctor if you are having " problems. It's also a good idea to know your test results and keep a list of the medicines you take.  How can you care for yourself at home?  Make a plan for your safety   If you decide to stay with your abusive spouse or partner, you can do the following to increase your safety:  Decide what works best to keep you safe in an emergency.  Know who you can call to help you in an emergency.  Decide if you will call the police if you get hurt again. If you can, agree on a signal with your children or neighbor to call the police for you if you need help. You can flash lights or hang something out of a window.  Choose a safe place to go for a short time if you need to leave home. Memorize the address and phone number.  Learn escape routes out of your home in case you need to leave in a hurry. Teach your children different ways to get out of your home quickly if they need to.  If you can, hide or lock up things that can be used as weapons (guns, knives, hammers).  Learn the number of a domestic violence shelter. Talk to the people there about how they can help.  Find out about other community resources that can help you.  Take pictures of bruises or other injuries if you can. You can also take pictures of things your abuser has broken.  Teach your children that violence is never okay. Tell them that they do not deserve to be hurt.  Pack a bag   Prepare a bag with things you will need if you leave suddenly. Leave it with a friend, a relative, or someone else you trust. You could include the following items in the bag:  A set of keys to your home and car.  Emergency phone numbers and addresses.  Money such as cash or checks. You can also ask a friend, a relative, or someone else you trust to hold money for you.  Copies of legal documents such as house and car titles or rent receipts, birth certificates, Social Security card, voter registration, marriage and 's licenses, and your children's health records.  Personal  "items you would need for a few days, such as clothes, a toothbrush, toothpaste, and any medicines you or your children need.  A favorite toy or book for your child or children.  Diapers and bottles, if you have very young children.  Pictures that show signs of abuse and violence. You may also add pictures of your abuser.  If you leave   If you decide to leave, you can take the following steps:  Go to the emergency room at a hospital if you have been hurt.  Think about asking the police to be with you as you leave. They can protect you as you leave your home.  If you decide to leave secretly, remember that activities can be tracked. Your abuser may still have access to your cell phone, email, and credit cards. It may be possible for these to be traced. Always be aware of your surroundings.  If this is an emergency, do not worry about gathering up anything. Just leave--your safety is most important.  If your abuser moves out, change the locks on the doors. If you have a security system, change the access code.  When should you call for help?   Call 911 anytime you think you may need emergency care. For example, call if:    You or someone else has just been abused.     You think you or someone else is in danger of being abused.   Watch closely for changes in your health, and be sure to contact your doctor if you have any problems.  Where can you learn more?  Go to https://www.[employer].net/patiented  Enter A752 in the search box to learn more about \"Intimate Partner Violence Safety Instructions: Care Instructions.\"  Current as of: June 24, 2023               Content Version: 14.0    5596-9237 [employer].   Care instructions adapted under license by your healthcare professional. If you have questions about a medical condition or this instruction, always ask your healthcare professional. [employer] disclaims any warranty or liability for your use of this information.      Learning About " Intimate Partner Violence  What is intimate partner violence?  Intimate partner violence is a type of domestic abuse. It's threatening, emotionally harmful, or violent behavior in a personal relationship. It can happen between past or current partners or spouses. In some relationships both people abuse each other. One partner may be more abusive. Or the abuse may be equal.  Abuse can affect people of any ethnic group, race, or Methodist. It can affect teens, adults, or the elderly. And it can happen to people of any sexual orientation, gender, or social status.  Abusers use fear, bullying, and threats to control their partners. They may control what their partners do. They may control where their partners go or who they see. They may act jealous, controlling, or possessive. These early signs of abuse may happen soon after the start of the relationship. Sometimes it can be hard to notice abuse at first. But after the relationship becomes more serious, the abuse may get worse.  If you are being abused in your relationship, it's important to get help. The abuse is not your fault. You don't have to face it alone.  Be careful  It may not be safe to take home domestic abuse information like this handout. Some people ask a trusted friend to keep it for them. It's also important to plan ahead and to memorize the phone number of places you can go for help. If you are concerned about your safety, do not use your computer, smartphone, or tablet to read about domestic abuse.   What are the types of intimate partner violence?  Abuse can happen in different ways. Each type can happen on its own or in combination with others.  Emotional abuse  Emotional abuse is a pattern of threats, insults, or controlling behavior. It includes verbal abuse. It goes beyond healthy disagreements in a relationship. It's a sign of an unhealthy relationship.  Do you feel threatened, intimidated, or controlled?  Does your partner:  Threaten your  children, other family members, or pets?  Use jokes meant to embarrass or shame you?  Call you names?  Tell you that you are a bad parent?  Threaten to take away your children?  Threaten to have you or your family members deported?  Control your access to money or other basic needs?  Control what you do, who you see or talk to, or where you go?  Another form of emotional abuse is denying that it is happening. Or the abuser may act like the abuse is no big deal or is your fault.  Sexual abuse  With sexual abuse, abusers may try to convince or force you to have sex. They may force you into sex acts you're not comfortable with. Or they may sexually assault you. Sexual abuse can happen even if you are in a committed relationship.  Physical abuse  Physical abuse means that a partner hits, kicks, or does something else to physically hurt you. Physical abuse that starts with a slap might lead to kicking, shoving, and choking over time. The abuser may also threaten to hurt or kill you.  Stalking  Stalking means that an abuser gives you attention that you do not want and that causes you fear. Examples of stalking include:  Following you.  Showing up at places where the abuser isn't invited, such as at your work or school.  Constantly calling or texting you.  What problems can  to?  Intimate partner violence can be very dangerous. It can cause serious, repeated injury. It can even lead to death.  All forms of abuse can cause long-term health problems from the stress of a violent relationship. Verbal abuse can lead to sexual and physical abuse.  Abuse causes:  Emotional pain.  Depression.  Anxiety.  Post-traumatic stress.  Sexual abuse can lead to sexually transmitted infections (such as HIV/AIDS) and unplanned pregnancy.  Pregnancy can be a very dangerous time for people in abusive relationships. Abuse can cause or increase the risk of problems during pregnancy. These include low weight gain, anemia, infections, and  "bleeding. Abuse may also increase your baby's risk of low birth weight, premature birth, and death.  It can be hard for some victims of abuse to ask for help or to leave their relationship. You may feel scared, stuck, or not sure what steps to take. But it's important not to ignore abuse. Talking to someone you trust could be the first step to ending the abuse and taking care of your own health and happiness again. There are resources available that can help keep you safe.  Where can you get help?  Talk to a trusted friend. Find a local advocacy group, or talk to your doctor about the abuse.  Contact the National Domestic Violence Hotline at 8-836-552-ZOZY (1-721.998.5835) for more safety tips. They can guide you to groups in your area that can help. Or go to the National Coalition Against Domestic Violence website at www.thehotlSignaCert.org to learn more.  Domestic violence groups or a counselor in your area can help you make a safety plan for yourself and your children.  When to call for help  Call 911 anytime you think you may need emergency care. For example, call if:  You think that you or someone you know is in danger of being abused.  You have been hurt and can't have someone safely take you to emergency care.  You have just been abused.  A family member has just been abused.  Where can you learn more?  Go to https://www.[employer].net/patiented  Enter S665 in the search box to learn more about \"Learning About Intimate Partner Violence.\"  Current as of: June 24, 2023               Content Version: 14.0    5987-8835 [employer].   Care instructions adapted under license by your healthcare professional. If you have questions about a medical condition or this instruction, always ask your healthcare professional. [employer] disclaims any warranty or liability for your use of this information.      Vaginal Bleeding During Pregnancy: Care Instructions  Overview     It's common to have some " vaginal spotting when you are pregnant. In some cases, the bleeding isn't serious. And there aren't any more problems with the pregnancy.  But sometimes bleeding is a sign of a more serious problem. This is more common if the bleeding is heavy or painful. Examples of more serious problems include miscarriage, an ectopic pregnancy, and a problem with the placenta.  You may have to see your doctor again to be sure everything is okay. You may also need more tests to find the cause of the bleeding.  Home treatment may be all you need. But it depends on what is causing the bleeding. Be sure to tell your doctor if you have any new symptoms or if your symptoms get worse.  The doctor has checked you carefully, but problems can develop later. If you notice any problems or new symptoms, get medical treatment right away.  Follow-up care is a key part of your treatment and safety. Be sure to make and go to all appointments, and call your doctor if you are having problems. It's also a good idea to know your test results and keep a list of the medicines you take.  How can you care for yourself at home?  If your doctor prescribed medicines, take them exactly as directed. Call your doctor if you think you are having a problem with your medicine.  Do not have vaginal sex until your doctor says it's okay.  Do not put anything in your vagina until your doctor says it's okay.  Ask your doctor about other activities you can or can't do.  Get a lot of rest. Being pregnant can make you tired.  Do not use nonsteroidal anti-inflammatory drugs (NSAIDs), such as ibuprofen (Advil, Motrin), naproxen (Aleve), or aspirin, unless your doctor says it is okay.  When should you call for help?   Call 911 anytime you think you may need emergency care. For example, call if:    You passed out (lost consciousness).     You have severe vaginal bleeding. This means you are soaking through a pad each hour for 2 or more hours.     You have sudden, severe pain  "in your belly or pelvis.   Call your doctor now or seek immediate medical care if:    You have new or worse vaginal bleeding.     You are dizzy or lightheaded, or you feel like you may faint.     You have pain in your belly, pelvis, or lower back.     You think that you are in labor.     You have a sudden release of fluid from your vagina.     You've been having regular contractions for an hour. This means that you've had at least 8 contractions within 1 hour or at least 4 contractions within 20 minutes, even after you change your position and drink fluids.     You notice that your baby has stopped moving or is moving much less than normal.   Watch closely for changes in your health, and be sure to contact your doctor if you have any problems.  Where can you learn more?  Go to https://www.[employer].net/patiented  Enter N829 in the search box to learn more about \"Vaginal Bleeding During Pregnancy: Care Instructions.\"  Current as of: July 10, 2023               Content Version: 14.0    5854-5116 [employer].   Care instructions adapted under license by your healthcare professional. If you have questions about a medical condition or this instruction, always ask your healthcare professional. [employer] disclaims any warranty or liability for your use of this information.      Weeks 10 to 14 of Your Pregnancy: Care Instructions  It's now possible to hear the fetus's heartbeat with a special ultrasound device. And the fetus's organs are developing.    Decide about tests to check for birth defects. Think about your age, your chance of passing on a family disease, your need to know about any problems, and what you might do after you have the test results.    It's okay to exercise. Try activities such as walking or swimming. Check with your doctor before starting a new program.    You may feel more tired than usual.  Taking naps during the day may help.     You may feel emotional.  It might help " "to talk to someone.     You may have headaches.  Try lying down and putting a cool cloth over your forehead.     You can use acetaminophen (Tylenol) for pain relief.  Don't take any anti-inflammatory medicines (such as Advil, Motrin, Aleve), unless your doctor says it's okay.     You may feel a fullness or aching in your lower belly.  This can feel like the kind of cramps you might get before a period. A back rub may help.     You may need to urinate more.  Your growing uterus and changing hormones can affect your bladder.     You may feel sick to your stomach (morning sickness).  Try avoiding food and smells that make you feel sick.     Your breasts may feel different.  They may feel tender or get bigger. Your nipples may get darker. Try a bra that gives you good support.     Avoid alcohol, tobacco, and drugs (including marijuana).  If you need help quitting, talk to your doctor.     Take a daily prenatal vitamin.  Choose one with folic acid.   Follow-up care is a key part of your treatment and safety. Be sure to make and go to all appointments, and call your doctor if you are having problems. It's also a good idea to know your test results and keep a list of the medicines you take.  Where can you learn more?  Go to https://www.[employer].net/patiented  Enter E090 in the search box to learn more about \"Weeks 10 to 14 of Your Pregnancy: Care Instructions.\"  Current as of: July 10, 2023               Content Version: 14.0    8221-3903 [employer].   Care instructions adapted under license by your healthcare professional. If you have questions about a medical condition or this instruction, always ask your healthcare professional. [employer] disclaims any warranty or liability for your use of this information.        Nutrition During Pregnancy: Care Instructions  Overview     Healthy eating when you are pregnant is important for you and your baby. It can help you feel well and have a " successful pregnancy and delivery. During pregnancy your nutrition needs increase. Even if you have excellent eating habits, your doctor may recommend a multivitamin to make sure you get enough iron and folic acid.  You may wonder how much weight you should gain. In general, if you were at a healthy weight before you became pregnant, then you should gain between 25 and 35 pounds. If you were overweight before pregnancy, then you'll likely be advised to gain 15 to 25 pounds. If you were underweight before pregnancy, then you'll probably be advised to gain 28 to 40 pounds. Your doctor will work with you to set a weight goal that is right for you. Gaining a healthy amount of weight helps you have a healthy baby.  Follow-up care is a key part of your treatment and safety. Be sure to make and go to all appointments, and call your doctor if you are having problems. It's also a good idea to know your test results and keep a list of the medicines you take.  How can you care for yourself at home?  Eat plenty of fruits and vegetables. Include a variety of orange, yellow, and leafy dark-green vegetables every day.  Choose whole-grain bread, cereal, and pasta. Good choices include whole wheat bread, whole wheat pasta, brown rice, and oatmeal.  Get 4 or more servings of milk and milk products each day. Good choices include nonfat or low-fat milk, yogurt, and cheese. If you cannot eat milk products, you can get calcium from calcium-fortified products such as orange juice, soy milk, and tofu. Other non-milk sources of calcium include leafy green vegetables, such as broccoli, kale, mustard greens, turnip greens, bok lisandro, and brussels sprouts.  If you eat meat, pick lower-fat types. Good choices include lean cuts of meat and chicken or turkey without the skin.  Avoid fish that are high in mercury. These include shark, swordfish, phoebe mackerel, marlin, orange roughy, and bigeye tuna, as well as tilefish from the [place] of  "[place].  It's okay to eat up to 8 to 12 ounces a week of fish that are low in mercury or up to 4 ounces a week of fish that have medium levels of mercury. Some fish that are low in mercury are salmon, shrimp, canned light tuna, cod, and tilapia. Some fish that have medium levels of mercury are halibut and white albacore tuna.  For more advice about eating fish, you can visit the U.S. Food and Drug Administration (FDA) or U.S. Environmental Protection Agency (EPA) website.  Heat lunch meats (such as turkey, ham, or bologna) to 165 F before you eat them. This reduces your risk of getting sick from a kind of bacteria that can be found in lunch meats.  Do not eat unpasteurized soft cheeses, such as brie, feta, fresh mozzarella, and blue cheese. They have a bacteria that could harm your baby.  Limit caffeine to about 200 to 300 mg per day. On average, a cup of brewed coffee has around 80 to 100 mg of caffeine.  Do not drink any alcohol. No amount of alcohol has been found to be safe during pregnancy.  Do not diet or try to lose weight. For example, do not follow a low-carbohydrate diet. If you are overweight at the start of your pregnancy, your doctor will work with you to manage your weight gain.  Tell your doctor about all vitamins and supplements you take.  When should you call for help?  Watch closely for changes in your health, and be sure to contact your doctor if you have any problems.  Where can you learn more?  Go to https://www.[employer].net/patiented  Enter Y785 in the search box to learn more about \"Nutrition During Pregnancy: Care Instructions.\"  Current as of: September 20, 2023               Content Version: 14.0    8314-6529 [employer].   Care instructions adapted under license by your healthcare professional. If you have questions about a medical condition or this instruction, always ask your healthcare professional. [employer] disclaims any warranty or liability for your use " of this information.      Exercise During Pregnancy: Care Instructions  Overview     Exercise is good for you during a healthy pregnancy. It can relieve back pain, swelling, and other discomforts. It also prepares your muscles for childbirth. And exercise can improve your energy level and help you sleep better.  If your doctor advises it, get more exercise. For example, walking is a good choice. Bit by bit, increase the amount you walk every day. Try for at least 30 minutes on most days of the week. You could also try a prenatal exercise class. But if you do not already exercise, be sure to talk with your doctor before you start a new exercise program. Doctors do not recommend contact sports during pregnancy.  Follow-up care is a key part of your treatment and safety. Be sure to make and go to all appointments, and call your doctor if you are having problems. It's also a good idea to know your test results and keep a list of the medicines you take.  How can you care for yourself at home?  Talk with your doctor about the right kind of exercise for each stage of pregnancy.  Listen to your body to know if your exercise is at a safe level.  Do not become overheated while you exercise. High body temperature can be harmful. Avoid activities that can make your body too hot.  If you feel tired, take it easy. You might walk instead of run.  If you are used to strenuous exercise, ask your doctor how to know when it's time to slow down.  If you exercised before getting pregnant, you should be able to keep up your routine early in your pregnancy. Later in your pregnancy, you may want to switch to more gentle activities.  Drink plenty of fluids before, during, and after exercise.  Avoid contact sports, such as soccer and basketball. Also avoid risky activities. These include scuba diving, horseback riding, and exercising at a high altitude (above 6,000 feet). If you live in a place with a high altitude, talk to your doctor about  "how you can exercise safely.  Do not get overtired while you exercise. You should be able to talk while you work out.  After your fourth month of pregnancy, avoid exercises that require you to lie flat on your back on a hard surface. These include sit-ups and some yoga poses.  Get plenty of rest. You may be very tired while you are pregnant.  Where can you learn more?  Go to https://www.[employer].net/patiented  Enter S801 in the search box to learn more about \"Exercise During Pregnancy: Care Instructions.\"  Current as of: July 10, 2023               Content Version: 14.0    8833-6065 [employer].   Care instructions adapted under license by your healthcare professional. If you have questions about a medical condition or this instruction, always ask your healthcare professional. [employer] disclaims any warranty or liability for your use of this information.      Learning About Pregnancy and Obesity  How does your weight affect your pregnancy?     The basics of prenatal care are the same for everyone, regardless of size. You'll get what you need to have a healthy baby.  But your size can make a difference in a few things. You and your doctor will have to watch your pregnancy weight. Your weight may affect your labor and delivery.  You may have some extra doctor visits and tests. And you may have some tests earlier in your pregnancy. You'll need to pay close attention to things like blood pressure and the chance of getting gestational diabetes. (This is a type of diabetes that sometimes happens during pregnancy.) And close attention will be given to your developing baby.  Work with your doctor to get the care you need. Go to all your doctor visits, and follow your doctor's advice about what to do and what to avoid during pregnancy.  How much weight gain is healthy?  If you are very overweight (obese), experts recommend that you gain between 11 and 20 pounds. Your doctor will work with you " "to set a weight goal that's right for you. In some cases, your doctor may recommend that you not gain any weight.  How much extra food do you need to eat?  Although you may joke that you're \"eating for two\" during pregnancy, you don't need to eat twice as much food. How much you can eat depends on:  Your height.  How much you weigh when you get pregnant.  How active you are.  If you're carrying more than one fetus (multiple pregnancy).  In the first trimester, you'll probably need the same amount of calories as you did before you were pregnant. In general, in your second trimester, you need to eat about 340 extra calories a day. In your third trimester, you need to eat about 450 extra calories a day.  What can you do to have a healthy pregnancy?  The best things you can do for you and your baby are to eat healthy foods, get regular exercise, avoid alcohol and smoking, and go to your doctor visits.  Eat a variety of foods from all the food groups. Make sure to get enough calcium and folic acid.  You may want to work with a dietitian to help you plan healthy meals to get the right amount of calories for you.  If you didn't exercise much before you got pregnant, talk to your doctor about how you can slowly get more active. Your doctor may want to set up an exercise program with you.  Where can you learn more?  Go to https://www.Brayola.net/patiented  Enter B644 in the search box to learn more about \"Learning About Pregnancy and Obesity.\"  Current as of: July 10, 2023               Content Version: 14.0    8668-6412 [employer].   Care instructions adapted under license by your healthcare professional. If you have questions about a medical condition or this instruction, always ask your healthcare professional. [employer] disclaims any warranty or liability for your use of this information.      You have been provided the CDC Warning Signs in Pregnancy document.    Additional copies can be " found here: www.Peel.HihoCoder/621419.pdf

## 2024-04-11 ENCOUNTER — OFFICE VISIT (OUTPATIENT)
Dept: FAMILY MEDICINE | Facility: CLINIC | Age: 29
End: 2024-04-11
Payer: COMMERCIAL

## 2024-04-11 VITALS
OXYGEN SATURATION: 99 % | TEMPERATURE: 97.3 F | RESPIRATION RATE: 14 BRPM | SYSTOLIC BLOOD PRESSURE: 128 MMHG | HEART RATE: 94 BPM | WEIGHT: 209 LBS | DIASTOLIC BLOOD PRESSURE: 89 MMHG | BODY MASS INDEX: 36.16 KG/M2

## 2024-04-11 DIAGNOSIS — O21.9 NAUSEA AND VOMITING DURING PREGNANCY: ICD-10-CM

## 2024-04-11 DIAGNOSIS — Z00.01 ENCOUNTER FOR ROUTINE ADULT PHYSICAL EXAM WITH ABNORMAL FINDINGS: Primary | ICD-10-CM

## 2024-04-11 DIAGNOSIS — Z12.4 CERVICAL CANCER SCREENING: ICD-10-CM

## 2024-04-11 DIAGNOSIS — Z34.91 FIRST TRIMESTER PREGNANCY: ICD-10-CM

## 2024-04-11 PROCEDURE — 99395 PREV VISIT EST AGE 18-39: CPT | Performed by: NURSE PRACTITIONER

## 2024-04-11 PROCEDURE — G0145 SCR C/V CYTO,THINLAYER,RESCR: HCPCS | Performed by: NURSE PRACTITIONER

## 2024-04-11 SDOH — HEALTH STABILITY: PHYSICAL HEALTH: ON AVERAGE, HOW MANY DAYS PER WEEK DO YOU ENGAGE IN MODERATE TO STRENUOUS EXERCISE (LIKE A BRISK WALK)?: 0 DAYS

## 2024-04-11 ASSESSMENT — SOCIAL DETERMINANTS OF HEALTH (SDOH): HOW OFTEN DO YOU GET TOGETHER WITH FRIENDS OR RELATIVES?: ONCE A WEEK

## 2024-04-11 ASSESSMENT — PAIN SCALES - GENERAL: PAINLEVEL: NO PAIN (0)

## 2024-04-11 NOTE — COMMUNITY RESOURCES LIST (ENGLISH)
April 11, 2024           YOUR PERSONALIZED LIST OF SERVICES & PROGRAMS           & RECREATION    Sports      [facility] - Sports Recreation  [address] [place] [address] (Distance: 23.9 miles)  Phone: [phone]  Website: https://www.[facility].gov/acc  Language: English  Fee: Free, Self pay      [place] - Sports clubs and recreational activities - [facility]  [address] [place] [address] (Distance: 18.0 miles)  Language: English  Fee: Self pay, Sliding scale      [employer]FT[employer]ALL  Website: http://www.[employer].Kiyon    Classes/Groups      [facility] - Group fitness classes  [address] [place] [address] (Distance: 15.8 miles)  Phone: [phone]  Website: http://Press4Kids/  Language: English  Fee: Self pay  Accessibility: [place] accessible      [employer] - Sit and Be Fit/[employer]LEX  [address] [place] [address] (Distance: 12.8 miles)  Website: https://physicaltherapyptVideoPros/programs/sit-and-be-fit/  Language: English  Fee: Free      [facility] - Adult Enrichment  Phone: [phone]  Website: https://Streamix/adults-seniors/adult-enrichment/  Language: English  Hours: Mon 7:30 AM - 4:00 PM Tue 7:30 AM - 4:00 PM Wed 7:30 AM - 4:00 PM Thu 7:30 AM - 4:00 PM Fri 7:30 AM - 4:00 PM               IMPORTANT NUMBERS & WEBSITES        Emergency Services  911  .   United Way  211 http://211unitedway.org  .   Poison Control  (783) 629-6477 http://[place]poison.org http://wisconsinpoison.org  .     Suicide and Crisis Lifeline  988 http://988lifeline.org  .   Childhelp National Child Abuse Hotline  543.610.5617 http://Childhelphotline.org   .   National Sexual Assault Hotline  (995) 978-2398 (HOPE) http://Rainn.org   .     National Runaway Safeline  (462) 206-9451 (RUNAWAY) http://1800runaway.org  .   Pregnancy & Postpartum  Support  Call/text 486-589-4546  [place]: http://ppsupportmn.org  [place]: http://GoInstant.com/wi  .   Substance Abuse National Helpline ([facility])  422-430-HELP (4406) http://Findtreatment.gov   .                DISCLAIMER: These resources have been generated via the [employer] Platform. [employer] does not endorse any service providers mentioned in this resource list. [employer] does not guarantee that the services mentioned in this resource list will be available to you or will improve your health or wellness.    [facility]

## 2024-04-11 NOTE — PROGRESS NOTES
Preventive Care Visit  [facility]  JAVY Wallace CNP, Family Medicine  Apr 11, 2024      Assessment & Plan     Encounter for routine adult physical exam with abnormal findings  Cervical cancer screening    - Pap Screen reflex to HPV if ASCUS - recommend age 25 - 29    First trimester pregnancy  OB as planned.     Nausea and vomiting during pregnancy  Symptomatic care strategies reviewed        Counseling  Appropriate preventive services were discussed with this patient, including applicable screening as appropriate for fall prevention, nutrition, physical activity, Tobacco-use cessation, weight loss and cognition.  Checklist reviewing preventive services available has been given to the patient.  Reviewed patient's diet, addressing concerns and/or questions.   The patient was instructed to see the dentist every 6 months.     Call or return to the clinic with any worsening of symptoms or no resolution. Patient/Parent verbalized understanding and is in agreement. Medication side effects reviewed.   Current Outpatient Medications   Medication Sig Dispense Refill    Prenatal Vit-Fe Fumarate-FA (PRENATAL MULTIVITAMIN  PLUS IRON) 27-1 MG TABS Take 1 tablet by mouth daily       Chart documentation with Dragon Voice recognition Software. Although reviewed after completion, some words and grammatical errors may remain.  Alysha Dawkins MSN,FNP-BC  [facility]  [id number] [place] [id number]  [phone]          Subjective   Joann is a 28 year old, presenting for the following:  Physical        4/11/2024     8:18 AM   Additional Questions   Roomed by Izzy        Health Care Directive  Patient does not have a Health Care Directive or Living Will:       Same partner forever .. Age 16  Declines STD screening  Nausea with early first pregnancy  Patient's last menstrual period was 02/26/2024.        4/11/2024   General Health   How would you rate  your overall physical health? Good   Feel stress (tense, anxious, or unable to sleep) Rather much   (!) STRESS CONCERN      4/11/2024   Nutrition   Three or more servings of calcium each day? (!) NO   Diet: Breakfast skipped   How many servings of fruit and vegetables per day? (!) 2-3   How many sweetened beverages each day? 0-1         4/11/2024   Exercise   Days per week of moderate/strenous exercise 0 days   (!) EXERCISE CONCERN      4/11/2024   Social Factors   Frequency of gathering with friends or relatives Once a week   Worry food won't last until get money to buy more No   Food not last or not have enough money for food? No   Do you have housing?  Yes   Are you worried about losing your housing? No   Lack of transportation? No   Unable to get utilities (heat,electricity)? No         4/11/2024   Dental   Dentist two times every year? (!) NO         4/11/2024   TB Screening   Were you born outside of the [place]? No         Today's PHQ-2 Score:       4/11/2024     8:19 AM   PHQ-2 ( 1999 [employer])   Q1: Little interest or pleasure in doing things 1   Q2: Feeling down, depressed or hopeless 1   PHQ-2 Score 2   Q1: Little interest or pleasure in doing things Several days   Q2: Feeling down, depressed or hopeless Several days   PHQ-2 Score 2           4/11/2024   Substance Use   Alcohol more than 3/day or more than 7/wk No   Do you use any other substances recreationally? (!) ALCOHOL    (!) CANNABIS PRODUCTS     Social History     Tobacco Use    Smoking status: Never    Smokeless tobacco: Never   Substance Use Topics    Alcohol use: Not Currently     Comment: Alcoholic Drinks/day: social-quit with pregnancy    Drug use: No        Mammogram Screening - Patient under 40 years of age: Routine Mammogram Screening not recommended.           4/11/2024   One time HIV Screening   Previous HIV test? No         4/11/2024   STI Screening   New sexual partner(s) since last STI/HIV test? No     History of abnormal Pap smear: Last 3  "Pap and HPV Results:       2017     8:50 AM   PAP / HPV   PAP (Historical) NIL            2017     8:50 AM   PAP / HPV   PAP (Historical) NIL            2024   Contraception/Family Planning   Questions about contraception or family planning (!) YES        Reviewed and updated as needed this visit by Provider                    Past Medical History:   Diagnosis Date    Anxiety     Depression      Past Surgical History:   Procedure Laterality Date    MOUTH SURGERY      wisdom teeth     OB History    Para Term  AB Living   1 0 0 0 0 0   SAB IAB Ectopic Multiple Live Births   0 0 0 0 0      # Outcome Date GA Lbr Ten/2nd Weight Sex Type Anes PTL Lv   1 Current              Current Outpatient Medications   Medication Sig Dispense Refill    Prenatal Vit-Fe Fumarate-FA (PRENATAL MULTIVITAMIN  PLUS IRON) 27-1 MG TABS Take 1 tablet by mouth daily       Allergies   Allergen Reactions    Measles And Rubella Virus Vaccine Hives         Review of Systems  Constitutional, neuro, ENT, endocrine, pulmonary, cardiac, gastrointestinal, genitourinary, musculoskeletal, integument and psychiatric systems are negative, except as otherwise noted.     Objective    Exam  /89   Pulse 94   Temp 97.3  F (36.3  C) (Tympanic)   Resp 14   Wt 94.8 kg (209 lb)   LMP 2024   SpO2 99%   BMI 36.16 kg/m     Estimated body mass index is 36.16 kg/m  as calculated from the following:    Height as of 19: 1.619 m (5' 3.75\").    Weight as of this encounter: 94.8 kg (209 lb).    Physical Exam  GENERAL: alert and no distress  EYES: Eyes grossly normal to inspection, PERRL and conjunctivae and sclerae normal  HENT: ear canals and TM's normal, nose and mouth without ulcers or lesions  NECK: no adenopathy, no asymmetry, masses, or scars  RESP: lungs clear to auscultation - no rales, rhonchi or wheezes  CV: regular rate and rhythm, normal S1 S2, no S3 or S4, no murmur, click or rub, no peripheral edema  ABDOMEN: " soft, nontender, no hepatosplenomegaly, no masses and bowel sounds normal   (female) w/bimanual: normal female external genitalia, normal urethral meatus, normal vaginal mucosa, and normal cervix/adnexa/uterus without masses or discharge  MS: no gross musculoskeletal defects noted, no edema  SKIN: no suspicious lesions or rashes  NEURO: Normal strength and tone, mentation intact and speech normal  PSYCH: mentation appears normal, affect normal/bright        Signed Electronically by: JAVY Wallace CNP

## 2024-04-15 LAB
BKR LAB AP GYN ADEQUACY: NORMAL
BKR LAB AP GYN INTERPRETATION: NORMAL
BKR LAB AP HPV REFLEX: NORMAL
BKR LAB AP LMP: NORMAL
BKR LAB AP PREVIOUS ABNORMAL: NORMAL
PATH REPORT.COMMENTS IMP SPEC: NORMAL
PATH REPORT.COMMENTS IMP SPEC: NORMAL
PATH REPORT.RELEVANT HX SPEC: NORMAL

## 2024-05-12 LAB
ABO/RH(D): NORMAL
ANTIBODY SCREEN: NEGATIVE
SPECIMEN EXPIRATION DATE: NORMAL

## 2024-05-13 ENCOUNTER — PRENATAL OFFICE VISIT (OUTPATIENT)
Dept: OBGYN | Facility: CLINIC | Age: 29
End: 2024-05-13
Payer: COMMERCIAL

## 2024-05-13 VITALS
RESPIRATION RATE: 18 BRPM | BODY MASS INDEX: 36.86 KG/M2 | SYSTOLIC BLOOD PRESSURE: 122 MMHG | DIASTOLIC BLOOD PRESSURE: 84 MMHG | WEIGHT: 208 LBS | HEIGHT: 63 IN | TEMPERATURE: 98.4 F | HEART RATE: 91 BPM

## 2024-05-13 DIAGNOSIS — Z34.00 ENCOUNTER FOR SUPERVISION PREGNANCY IN PRIMIGRAVIDA, ANTEPARTUM: Primary | ICD-10-CM

## 2024-05-13 DIAGNOSIS — E66.812 OBESITY, CLASS II, BMI 35-39.9, NO COMORBIDITY: ICD-10-CM

## 2024-05-13 DIAGNOSIS — R11.0 NAUSEA: ICD-10-CM

## 2024-05-13 LAB
ALBUMIN UR-MCNC: NEGATIVE MG/DL
APPEARANCE UR: CLEAR
BILIRUB UR QL STRIP: NEGATIVE
C TRACH DNA SPEC QL PROBE+SIG AMP: NEGATIVE
COLOR UR AUTO: YELLOW
ERYTHROCYTE [DISTWIDTH] IN BLOOD BY AUTOMATED COUNT: 12.4 % (ref 10–15)
GLUCOSE UR STRIP-MCNC: NEGATIVE MG/DL
HBA1C MFR BLD: 5.2 % (ref 0–5.6)
HBV CORE AB SERPL QL IA: NONREACTIVE
HBV SURFACE AB SERPL IA-ACNC: <3.5 M[IU]/ML
HBV SURFACE AB SERPL IA-ACNC: NONREACTIVE M[IU]/ML
HBV SURFACE AG SERPL QL IA: NONREACTIVE
HCT VFR BLD AUTO: 39.9 % (ref 35–47)
HCV AB SERPL QL IA: NONREACTIVE
HGB BLD-MCNC: 13.5 G/DL (ref 11.7–15.7)
HGB UR QL STRIP: NEGATIVE
HIV 1+2 AB+HIV1 P24 AG SERPL QL IA: NONREACTIVE
KETONES UR STRIP-MCNC: NEGATIVE MG/DL
LEUKOCYTE ESTERASE UR QL STRIP: NEGATIVE
MCH RBC QN AUTO: 28.7 PG (ref 26.5–33)
MCHC RBC AUTO-ENTMCNC: 33.8 G/DL (ref 31.5–36.5)
MCV RBC AUTO: 85 FL (ref 78–100)
N GONORRHOEA DNA SPEC QL NAA+PROBE: NEGATIVE
NITRATE UR QL: NEGATIVE
PH UR STRIP: 6 [PH] (ref 5–7)
PLATELET # BLD AUTO: 327 10E3/UL (ref 150–450)
RBC # BLD AUTO: 4.71 10E6/UL (ref 3.8–5.2)
RBC #/AREA URNS AUTO: NORMAL /HPF
RUBV IGG SERPL QL IA: 0.57 INDEX
RUBV IGG SERPL QL IA: NORMAL
SP GR UR STRIP: 1.02 (ref 1–1.03)
T PALLIDUM AB SER QL: NONREACTIVE
UROBILINOGEN UR STRIP-ACNC: 0.2 E.U./DL
WBC # BLD AUTO: 7.5 10E3/UL (ref 4–11)
WBC #/AREA URNS AUTO: NORMAL /HPF

## 2024-05-13 PROCEDURE — 87340 HEPATITIS B SURFACE AG IA: CPT | Performed by: STUDENT IN AN ORGANIZED HEALTH CARE EDUCATION/TRAINING PROGRAM

## 2024-05-13 PROCEDURE — 99459 PELVIC EXAMINATION: CPT | Performed by: STUDENT IN AN ORGANIZED HEALTH CARE EDUCATION/TRAINING PROGRAM

## 2024-05-13 PROCEDURE — 86780 TREPONEMA PALLIDUM: CPT | Performed by: STUDENT IN AN ORGANIZED HEALTH CARE EDUCATION/TRAINING PROGRAM

## 2024-05-13 PROCEDURE — 86762 RUBELLA ANTIBODY: CPT | Performed by: STUDENT IN AN ORGANIZED HEALTH CARE EDUCATION/TRAINING PROGRAM

## 2024-05-13 PROCEDURE — 87086 URINE CULTURE/COLONY COUNT: CPT | Performed by: STUDENT IN AN ORGANIZED HEALTH CARE EDUCATION/TRAINING PROGRAM

## 2024-05-13 PROCEDURE — 86706 HEP B SURFACE ANTIBODY: CPT | Performed by: STUDENT IN AN ORGANIZED HEALTH CARE EDUCATION/TRAINING PROGRAM

## 2024-05-13 PROCEDURE — 36415 COLL VENOUS BLD VENIPUNCTURE: CPT | Performed by: STUDENT IN AN ORGANIZED HEALTH CARE EDUCATION/TRAINING PROGRAM

## 2024-05-13 PROCEDURE — 87389 HIV-1 AG W/HIV-1&-2 AB AG IA: CPT | Performed by: STUDENT IN AN ORGANIZED HEALTH CARE EDUCATION/TRAINING PROGRAM

## 2024-05-13 PROCEDURE — 86900 BLOOD TYPING SEROLOGIC ABO: CPT | Performed by: STUDENT IN AN ORGANIZED HEALTH CARE EDUCATION/TRAINING PROGRAM

## 2024-05-13 PROCEDURE — 87491 CHLMYD TRACH DNA AMP PROBE: CPT | Performed by: STUDENT IN AN ORGANIZED HEALTH CARE EDUCATION/TRAINING PROGRAM

## 2024-05-13 PROCEDURE — 99203 OFFICE O/P NEW LOW 30 MIN: CPT | Mod: 25 | Performed by: STUDENT IN AN ORGANIZED HEALTH CARE EDUCATION/TRAINING PROGRAM

## 2024-05-13 PROCEDURE — 81001 URINALYSIS AUTO W/SCOPE: CPT | Performed by: STUDENT IN AN ORGANIZED HEALTH CARE EDUCATION/TRAINING PROGRAM

## 2024-05-13 PROCEDURE — 85027 COMPLETE CBC AUTOMATED: CPT | Performed by: STUDENT IN AN ORGANIZED HEALTH CARE EDUCATION/TRAINING PROGRAM

## 2024-05-13 PROCEDURE — 86850 RBC ANTIBODY SCREEN: CPT | Performed by: STUDENT IN AN ORGANIZED HEALTH CARE EDUCATION/TRAINING PROGRAM

## 2024-05-13 PROCEDURE — 86901 BLOOD TYPING SEROLOGIC RH(D): CPT | Performed by: STUDENT IN AN ORGANIZED HEALTH CARE EDUCATION/TRAINING PROGRAM

## 2024-05-13 PROCEDURE — 86803 HEPATITIS C AB TEST: CPT | Performed by: STUDENT IN AN ORGANIZED HEALTH CARE EDUCATION/TRAINING PROGRAM

## 2024-05-13 PROCEDURE — 86704 HEP B CORE ANTIBODY TOTAL: CPT | Performed by: STUDENT IN AN ORGANIZED HEALTH CARE EDUCATION/TRAINING PROGRAM

## 2024-05-13 PROCEDURE — 76817 TRANSVAGINAL US OBSTETRIC: CPT | Performed by: STUDENT IN AN ORGANIZED HEALTH CARE EDUCATION/TRAINING PROGRAM

## 2024-05-13 PROCEDURE — 87591 N.GONORRHOEAE DNA AMP PROB: CPT | Performed by: STUDENT IN AN ORGANIZED HEALTH CARE EDUCATION/TRAINING PROGRAM

## 2024-05-13 PROCEDURE — 83036 HEMOGLOBIN GLYCOSYLATED A1C: CPT | Performed by: STUDENT IN AN ORGANIZED HEALTH CARE EDUCATION/TRAINING PROGRAM

## 2024-05-13 RX ORDER — ONDANSETRON 4 MG/1
4 TABLET, ORALLY DISINTEGRATING ORAL EVERY 8 HOURS PRN
Qty: 30 TABLET | Refills: 0 | Status: SHIPPED | OUTPATIENT
Start: 2024-05-13 | End: 2024-07-12

## 2024-05-13 NOTE — NURSING NOTE
"Initial /84 (BP Location: Right arm, Patient Position: Chair, Cuff Size: Adult Regular)   Pulse 91   Temp 98.4  F (36.9  C) (Tympanic)   Resp 18   Ht 1.6 m (5' 3\")   Wt 94.3 kg (208 lb)   LMP 02/26/2024   BMI 36.85 kg/m   Estimated body mass index is 36.85 kg/m  as calculated from the following:    Height as of this encounter: 1.6 m (5' 3\").    Weight as of this encounter: 94.3 kg (208 lb). .    "

## 2024-05-13 NOTE — PATIENT INSTRUCTIONS
Joann,  It was nice to meet you and Isaak today. One thing that I forgot to mention is that you should start a low-dose aspirin, 81 mg daily, to lower your risk of having preeclampsia starting on 5/20 at 12 week 0 day gestation.    Women with more than one of the moderate-risk factors (first pregnancy, a body mass index of more than 30) should receive low-dose (81 mg/day) aspirin for preeclampsia prophylaxis initiated between 12 weeks and continuing until delivery.    Let me know if you have any questions about this recommendation.

## 2024-05-13 NOTE — PROGRESS NOTES
[facility]  New OB Visit Note         Assessment and Plan:      Joann Toscano, 29 year old  at 11w0d by LMP c/w 10w4d US, presents for her initial OB visit.     Routine prenatal care:  - New OB labs ordered today: T&S, CBC, RPR/HIV/HepB surface antigen (Hep B sAg)/HepB anti-surface antibody (anti-HBs)/HepB total core antibody (total anti-HBc)/HepC antibody, Rubella antibody, chlamydia/gonorrhea PCR; last PAP smear 2024 NILM  - Dating/viability US performed today   - Genetic screen: NIPT obtained today  - Counseling: Discussed routine prenatal care, group practice model at [facility], tertiary support and frequency of visits. Reviewed miscarriage precautions (present to ED or call clinic with abdominal pain, vaginal bleeding or fever). Weight gain: discussed weight gain expectations (BMI >30: 11-20lbs)   - Delivery plan: continue to discuss route of delivery, breastfeeding, pp contraception, pain management in labor    Prepregnancy BMI 37.21: baseline hgbA1C obtained today. Need ongoing discussion about aspirin and weight gain in pregnancy.    Nausea: Continue small frequent meals, ajith chewable candy, vitamin B6 and Unisom.  Zofran prescription given today.    RTC 4 weeks    Yin Esqueda MD  Obstetrics and Gynecology  [facility]   2024          Subjective:   This is a planned pregnancy and her and her  are excited. She reports feeling well.  Continue to have ongoing dark brown vaginal spotting with rare cramping.  Endorsed nausea without vomiting.    ROS: A 10 pt ROS was completed and found to be negative unless mentioned in the HPI.          OB History:     OB History    Para Term  AB Living   1 0 0 0 0 0   SAB IAB Ectopic Multiple Live Births   0 0 0 0 0      # Outcome Date GA Lbr Ten/2nd Weight Sex Type Anes PTL Lv   1 Current                   Past Medical History:     Past Medical History:   Diagnosis Date    Anxiety      Depression      Patient Active Problem List    Diagnosis Date Noted    Prenatal care, first pregnancy 04/10/2024     Priority: Medium     FOB- Isaak Coronel     Specifically, denied history of asthma or cHTN.       Past Surgical History:     Past Surgical History:   Procedure Laterality Date    MOUTH SURGERY      wisdom teeth     Specifically, denied history of abdominal surgeries.       Social History:   Denies smoking, alcohol use, recreational drug use.  Social History     Tobacco Use    Smoking status: Never    Smokeless tobacco: Never   Substance Use Topics    Alcohol use: Not Currently     Comment: Alcoholic Drinks/day: social-quit with pregnancy          Family History:   Denies family history of bleeding disorder, clotting disorder, babies born with congenital abnormalities.  Mother's father's brother had adverse reactions to anesthesia.  Mother also had problems with anesthesia -will figure out exactly what happened.  Family History   Problem Relation Age of Onset    Anxiety Disorder Mother     Depression Mother     Anxiety Disorder Father     Depression Father     Anxiety Disorder Sister     Alcoholism Sister         recovered    Diabetes Type 2  Paternal Grandfather     Coronary Artery Disease Paternal Grandfather         MI    Breast Cancer No family hx of           Immunizations:     Immunization History   Administered Date(s) Administered    COVID-19 Monovalent 18+ (Moderna) 02/05/2022    DTaP, Unspecified 1996, 1996, 1996, 08/08/1997, 04/11/2001    HEPATITIS A (PEDS 12M-18Y) 08/04/2006, 07/31/2008    HPV Quadrivalent 07/31/2008, 07/15/2010, 09/10/2010, 01/14/2011    Hepatitis B, Peds 1996, 1996, 1996    Historic Hib Hib-titer 1996, 1996, 1996, 08/08/1997    Influenza (H1N1) 01/22/2010    Influenza (IIV3) PF 12/08/2003    Influenza Intranasal Vaccine 01/14/2011, 10/10/2012    Influenza Vaccine >6 months,quad, PF 11/12/2013    Influenza Vaccine, 6+MO  "IM (QUADRIVALENT W/PRESERVATIVES) 10/07/2016    MMR 08/08/1997    Meningococcal B (Bexsero ) 05/25/2017, 07/27/2017    Meningococcal Mcv4 Conjugate,unspecified  07/31/2008, 08/06/2012    OPV, unspecified 1996, 1996, 1996    Pneumococcal (PCV 7) 04/11/2001    Poliovirus, inactivated (IPV) 04/11/2001    Rubella 06/04/2001    TDAP (Adacel,Boostrix) 07/31/2008    TDAP Vaccine (Adacel) 12/12/2017    Varicella 05/09/1997, 07/31/2008          Allergies:     Allergies   Allergen Reactions    Measles And Rubella Virus Vaccine Hives          Medications:     Current Outpatient Medications   Medication Sig Dispense Refill    Prenatal Vit-Fe Fumarate-FA (PRENATAL MULTIVITAMIN  PLUS IRON) 27-1 MG TABS Take 1 tablet by mouth daily           Objective:   /84 (BP Location: Right arm, Patient Position: Chair, Cuff Size: Adult Regular)   Pulse 91   Temp 98.4  F (36.9  C) (Tympanic)   Resp 18   Ht 1.6 m (5' 3\")   Wt 94.3 kg (208 lb)   LMP 02/26/2024   BMI 36.85 kg/m      Estimated body mass index is 36.85 kg/m  as calculated from the following:    Height as of this encounter: 1.6 m (5' 3\").    Weight as of this encounter: 94.3 kg (208 lb).    General appearance: well-hydrated, A&O x 3, no apparent distress  Lungs: Equal expansion bilaterally, no accessory muscle use  Heart: No heaves or thrills. No peripheral varicosities  Constitutional: See vitals  Abdomen: Soft, non-tender, non-distended. No rebound, rigidity, or guarding.  Genitourinary:  External genitalia: no erythema, no lesions.   Urethral meatus appropriate location without lesions or prolapse  Urethra: No masses, tenderness, or scarring  Bladder no fullness, masses, or tenderness.  Anus and Perineum: Unremarkable, no visible lesions  Vagina: Normal, healthy pink mucosa without any lesions. Physiologic vaginal discharge.  Dark brown vaginal old blood noted without active bleeding.  No ectropion noted.  Cervix: normal appearance, no cervical " motion tenderness.   Uterus: gravid  Adnexa: no masses or tenderness bilaterally.         Labs/Imagings:     Bedside Ultrasound:     Transvaginal ultrasound was performed. A intrauterine pregnancy was seen.      CRL= 3.67 cm   FHT= 168 bpm  EGA= 10 weeks 4 days  ISAAC based on US = 12/5/2024  ISAAC by LMP= 12/2/2024  FINAL ISAAC= 12/2/2024

## 2024-05-14 LAB — BACTERIA UR CULT: NORMAL

## 2024-05-20 LAB — SCANNED LAB RESULT: NORMAL

## 2024-06-12 ENCOUNTER — PRENATAL OFFICE VISIT (OUTPATIENT)
Dept: OBGYN | Facility: CLINIC | Age: 29
End: 2024-06-12
Payer: COMMERCIAL

## 2024-06-12 VITALS
HEIGHT: 63 IN | HEART RATE: 85 BPM | DIASTOLIC BLOOD PRESSURE: 77 MMHG | TEMPERATURE: 97.8 F | RESPIRATION RATE: 18 BRPM | SYSTOLIC BLOOD PRESSURE: 118 MMHG | BODY MASS INDEX: 37.92 KG/M2 | WEIGHT: 214 LBS

## 2024-06-12 DIAGNOSIS — Z34.02 ENCOUNTER FOR PRENATAL CARE IN SECOND TRIMESTER OF FIRST PREGNANCY: Primary | ICD-10-CM

## 2024-06-12 PROCEDURE — 99207 PR PRENATAL VISIT: CPT | Performed by: OBSTETRICS & GYNECOLOGY

## 2024-06-12 NOTE — PROGRESS NOTES
"[facility]    Return OB Note    CC: Return OB     Subjective:  Joann is a 29 year old  at 15w2d   Denies vaginal bleeding, loss of fluid, or pelvic cramping. No fetal movement yet.  Complaints today: None    Objective:  /77 (BP Location: Right arm, Patient Position: Sitting, Cuff Size: Adult Large)   Pulse 85   Temp 97.8  F (36.6  C) (Tympanic)   Resp 18   Ht 1.6 m (5' 3\")   Wt 97.1 kg (214 lb)   LMP 2024   BMI 37.91 kg/m      FHT: 145bpm    Assessment/Plan:   Encounter Diagnosis   Name Primary?    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 FOB has not been tested, this was ordered today. Plan MFM referral if he is also a carrier.   -Obesity: pre pregnancy BMI 37. Baseline hba1c WNL. Plan growth US at 32 weeks, BPPs at 37 weeks      RTC 4 weeks    Farrah Valle DO    " This note was copied from the mother's chart.  Initial lactation consult: Pt delivered at 39 weeks and 6 days via repeat  section. Pt has a history of hyperthyroidism postpartum, she is currently not on any medication for this. Pt breast fed her first child for about 6 weeks and her second for about 18 months without issues. Pt reports that the infant is breastfeeding well. She denies pain and states that he latches without difficulty. Infant is currently in a deep sleep, a feeding was not observed at this time. Pt was encouraged to call for assistance as needed.

## 2024-06-12 NOTE — PATIENT INSTRUCTIONS
"Weeks 14 to 18 of Your Pregnancy: Care Instructions  Around this time, you may start to look pregnant. Your baby is now able to pass urine. And the first stool (meconium) is starting to collect in your baby's intestines. Hair is starting to grow on your baby's head.    You may notice some skin changes, such as itchy spots on your palms or acne on your face.    At your next doctor visit, you may have an ultrasound. So you might think about whether you want to know the sex of your baby. Also ask your doctor about flu and COVID-19 shots.     How to reduce stress   Ask for help when you need it.  Try to avoid things that cause you stress.  Seek out things that relieve stress, such as breathing exercises or yoga.     How to get exercise   If you don't usually exercise, start slowly. Short walks may be a good choice.  Try to be active 30 minutes a day, at least 5 days a week.  Avoid activities where you're more likely to fall.  Use light weights to reduce stress on your joints.     How to stay at a healthy weight for you   Talk to your doctor or midwife about how much weight you should gain.  It's generally best to gain:  About 28 to 40 pounds if you're underweight.  About 25 to 35 pounds if you're at a healthy weight.  About 15 to 25 pounds if you're overweight.  About 11 to 20 pounds if you're very overweight (obese).  Follow-up care is a key part of your treatment and safety. Be sure to make and go to all appointments, and call your doctor if you are having problems. It's also a good idea to know your test results and keep a list of the medicines you take.  Where can you learn more?  Go to https://www.[employer].net/patiented  Enter I453 in the search box to learn more about \"Weeks 14 to 18 of Your Pregnancy: Care Instructions.\"  Current as of: July 10, 2023               Content Version: 14.0    9248-5107 [employer].   Care instructions adapted under license by your healthcare professional. If you have " questions about a medical condition or this instruction, always ask your healthcare professional. Healthwise, [facility] disclaims any warranty or liability for your use of this information.      Second-Trimester Fetal Ultrasound: About This Test  What is it?     Fetal ultrasound is a test that uses sound waves to make pictures of your baby (fetus) and placenta inside the uterus. The test is the safest way to find out the age, size, and position of your baby. You also may be able to find out the sex of your baby. (But the test isn't done just to find out a baby's sex.)  No known risks to the mother or the baby are linked to fetal ultrasound. But you may feel anxious if the test reveals a problem with your pregnancy or baby.  Why is this test done?  In the second trimester, a fetal ultrasound is done to:  Estimate the number of weeks and days a fetus has developed since the beginning of the pregnancy. This is called the gestational age.  Look at the size and position of the fetus, the placenta, and the fluid that surrounds the fetus.  Find major birth defects, such as heart problems or problems with the brain and spinal cord (neural tube defects). But the test may not be able to find many minor defects and some major birth defects.  How do you prepare for the test?  In general, there's nothing you have to do before this test, unless your doctor tells you to.  How is the test done?  You may be able to leave your clothes on, or you will be given a gown to wear.  You will lie on your back on a padded examination table.  A gel will be spread on your belly. It will be removed after the test.  A small, handheld device called a transducer will be pressed against the gel on your skin and moved across your belly several times.  You may watch the monitor to see the picture of your baby during the test.  What happens after the test?  You will probably be able to go home right away.  You most likely will be able to go back to  "your usual activities right away.  Follow-up care is a key part of your treatment and safety. Be sure to make and go to all appointments, and call your doctor if you are having problems. It's also a good idea to keep a list of the medicines you take. Ask your doctor when you can expect to have your test results.  Where can you learn more?  Go to https://www.[employer]/patiented  Enter Y671 in the search box to learn more about \"Second-Trimester Fetal Ultrasound: About This Test.\"  Current as of: July 10, 2023               Content Version: 14.0    6251-1428 [employer].   Care instructions adapted under license by your healthcare professional. If you have questions about a medical condition or this instruction, always ask your healthcare professional. [employer] disclaims any warranty or liability for your use of this information.      During Pregnancy: Exercises  Introduction  Here are some examples of exercises to do during your pregnancy. Start each exercise slowly. Ease off the exercise if you start to have pain.  Talk to your doctor about when you can start these exercises and which ones will work best for you.  How to do the exercises  Neck rotation    Sit up straight in a firm chair, or stand up straight. If you're standing, keep your feet about hip-width apart.  Keeping your chin level, turn your head to the right and hold for 15 to 30 seconds.  Turn your head to the left and hold for 15 to 30 seconds.  Repeat 2 to 4 times.  Neck stretch to the front    Sit up straight in a firm chair, or stand up straight. Look straight ahead. If you're standing, keep your feet about hip-width apart.  Slowly bend your head forward without moving your shoulders.  Hold for 15 to 30 seconds, then return to your starting position.  Repeat 2 to 4 times.  Back press    Stand with your back 10 to 12 inches away from a wall.  Lean into the wall until your back is against it. Press your lower back " against the wall by pulling in your stomach muscles.  Slowly slide down until your knees are slightly bent, pressing your lower back against the wall.  Hold for at least 6 seconds, then slide back up the wall.  Repeat 8 to 12 times.  Over time, work up to holding this position for as much as 1 minute.  Trunk twist    Sit on the floor with your legs crossed. If that's not comfortable, you can sit on a folded blanket so your bottom is a few inches off the floor. Or you can sit on a chair with your knees hip-width apart and your feet flat on the floor.  Reach your left hand toward your right knee. You can place your right hand at your side for support.  Slowly twist your body (trunk) to your right.  Relax and return to your starting position.  Repeat 2 to 4 times.  Switch your hands and twist to your left.  Repeat 2 to 4 times.  Pelvic rocking on hands and knees    Start on your hands and knees. Place your wrists directly below your shoulders and your knees below your hips.  Breathe in slowly. Tuck your head downward and round your back up, making a curve with your back in the shape of the letter C. Hold this position for about 6 seconds.  Breathe out slowly and bring your head back up. Relax, keeping your back straight. (Don't allow it to curve toward the floor.) Hold for about 6 seconds.  Repeat 8 to 12 times, gently rocking your pelvis.  Pelvic tilt    Lie on your back with your knees bent and your feet flat on the floor.  Tighten your belly muscles by pulling your belly button in toward your spine. Press your lower back to the floor. You should feel your hips and pelvis rock back.  Hold for 6 seconds while breathing smoothly, and then relax.  Repeat 8 to 12 times.  Do this exercise only during the first 4 months of pregnancy. After this point, lying on your back is not recommended, because it can cause blood flow problems for you and your baby.  Backward stretch    Start on your hands and knees with your knees 8 to  10 inches apart, hands directly below your shoulders, and arms and back straight.  Keeping your arms straight, slowly lower your buttocks toward your heels and tuck your head toward your knees. Hold for 15 to 30 seconds.  Slowly return to the starting position.  Repeat 2 to 4 times.  Forward bend    Sit comfortably in a chair, with your arms relaxed.  Slowly bend forward, allowing your arms to hang down. Lean only as far as you can without feeling discomfort or pressure on your belly.  Hold for 15 to 30 seconds and then slowly sit up straight.  Repeat 2 to 4 times or to your comfort level.  Donkey kick    Start on your hands and knees. Place your hands directly below your shoulders, and keep your arms straight.  Tighten your belly muscles by pulling your belly button in toward your spine. Keep breathing normally, and don't hold your breath.  Lift one knee and bring it toward your elbow.  Slowly extend that leg behind you without completely straightening it. Be careful not to let your hip drop down. Avoid arching your back.  Hold your leg behind you for about 6 seconds.  Return to your starting position.  Repeat 8 to 12 times for each leg.  Tailor sitting    Sit on the floor.  Bring your feet close to your body while crossing your ankles.  Keep your back straight. Relax your legs and let your knees drop toward the floor.  Hold this position for as long as you are comfortable.  Toe reach    Sit on the floor with your back straight, legs about 12 inches apart, and feet relaxed outward.  Stretch your hands forward toward your right foot, then sit up.  Stretch your hands straight forward, then sit up.  Stretch your hands forward toward your left foot, then sit up.  Hold each stretch for 15 to 30 seconds.  Repeat 2 to 4 times.  Follow-up care is a key part of your treatment and safety. Be sure to make and go to all appointments, and call your doctor if you are having problems. It's also a good idea to know your test  results and keep a list of the medicines you take.  Current as of: July 10, 2023               Content Version: 14.0    7392-3466 [employer].   Care instructions adapted under license by your healthcare professional. If you have questions about a medical condition or this instruction, always ask your healthcare professional. [employer] disclaims any warranty or liability for your use of this information.

## 2024-06-12 NOTE — NURSING NOTE
"Initial /77 (BP Location: Right arm, Patient Position: Sitting, Cuff Size: Adult Large)   Pulse 85   Temp 97.8  F (36.6  C) (Tympanic)   Resp 18   Ht 1.6 m (5' 3\")   Wt 97.1 kg (214 lb)   LMP 02/26/2024   BMI 37.91 kg/m   Estimated body mass index is 37.91 kg/m  as calculated from the following:    Height as of this encounter: 1.6 m (5' 3\").    Weight as of this encounter: 97.1 kg (214 lb). .    "

## 2024-07-11 ENCOUNTER — HOSPITAL ENCOUNTER (OUTPATIENT)
Dept: ULTRASOUND IMAGING | Facility: CLINIC | Age: 29
Discharge: HOME | End: 2024-07-11
Attending: OBSTETRICS & GYNECOLOGY | Admitting: OBSTETRICS & GYNECOLOGY
Payer: COMMERCIAL

## 2024-07-11 DIAGNOSIS — Z34.02 ENCOUNTER FOR PRENATAL CARE IN SECOND TRIMESTER OF FIRST PREGNANCY: ICD-10-CM

## 2024-07-11 PROCEDURE — 76805 OB US >/= 14 WKS SNGL FETUS: CPT

## 2024-07-12 ENCOUNTER — PRENATAL OFFICE VISIT (OUTPATIENT)
Dept: OBGYN | Facility: CLINIC | Age: 29
End: 2024-07-12
Payer: COMMERCIAL

## 2024-07-12 VITALS
TEMPERATURE: 98.4 F | DIASTOLIC BLOOD PRESSURE: 80 MMHG | HEART RATE: 83 BPM | HEIGHT: 63 IN | SYSTOLIC BLOOD PRESSURE: 117 MMHG | WEIGHT: 215.5 LBS | BODY MASS INDEX: 38.18 KG/M2 | RESPIRATION RATE: 18 BRPM

## 2024-07-12 DIAGNOSIS — Z34.02 PRENATAL CARE, FIRST PREGNANCY IN SECOND TRIMESTER: Primary | ICD-10-CM

## 2024-07-12 PROCEDURE — 99207 PR PRENATAL VISIT: CPT | Performed by: OBSTETRICS & GYNECOLOGY

## 2024-07-12 NOTE — NURSING NOTE
"Initial /80 (BP Location: Left arm, Patient Position: Sitting, Cuff Size: Adult Large)   Pulse 83   Temp 98.4  F (36.9  C) (Tympanic)   Resp 18   Ht 1.6 m (5' 3\")   Wt 97.8 kg (215 lb 8 oz)   LMP 02/26/2024   BMI 38.17 kg/m   Estimated body mass index is 38.17 kg/m  as calculated from the following:    Height as of this encounter: 1.6 m (5' 3\").    Weight as of this encounter: 97.8 kg (215 lb 8 oz). .    "

## 2024-07-12 NOTE — PATIENT INSTRUCTIONS

## 2024-07-12 NOTE — PROGRESS NOTES
"[facility]     Return OB Note     CC: Return OB      Subjective:  Joann is a 29 year old  at 19w4d   Denies vaginal bleeding, loss of fluid. Has some cramping but uses a heating pad on her back that helps. + Fetal movement. Patient had a headache two weeks ago that lasted for 2 days. Had to lay in a dark room due to light sensitivity. Has not had one as bad since. Denies vision changes, upper abdominal pain, heart pain, or extremity swelling.   Complaints today: None     Objective:  /80 (BP Location: Left arm, Patient Position: Sitting, Cuff Size: Adult Large)   Pulse 83   Temp 98.4  F (36.9  C) (Tympanic)   Resp 18   Ht 1.6 m (5' 3\")   Wt 97.8 kg (215 lb 8 oz)   LMP 2024   BMI 38.17 kg/m       FHT: 140bpm     Assessment/Plan:        Encounter Diagnosis   Name Primary?    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 FOB was negative.    -Obesity: pre pregnancy BMI 37. Baseline hba1c WNL. Plan growth US at 32 weeks, BPPs at 37 weeks        RTC 4 weeks    Ibrahima Silva MS3  [facility]    I agree with the medical student's documentation above and have edited it for accuracy. I was present for and performed the physical exam and interview of the patient myself. All medical decision-making was performed by me. If a procedure was performed, that was performed by me. Additionally, if billing is based on time, I am only using the time that I personally spent with the patient in my time statement.     Chapis Ayoub MD  OB/GYN         "

## 2024-08-07 ENCOUNTER — PRENATAL OFFICE VISIT (OUTPATIENT)
Dept: OBGYN | Facility: CLINIC | Age: 29
End: 2024-08-07
Payer: COMMERCIAL

## 2024-08-07 VITALS
SYSTOLIC BLOOD PRESSURE: 111 MMHG | WEIGHT: 217 LBS | DIASTOLIC BLOOD PRESSURE: 76 MMHG | BODY MASS INDEX: 38.45 KG/M2 | RESPIRATION RATE: 18 BRPM | HEIGHT: 63 IN | TEMPERATURE: 97.8 F | HEART RATE: 75 BPM

## 2024-08-07 DIAGNOSIS — Z34.02 PRENATAL CARE, FIRST PREGNANCY IN SECOND TRIMESTER: Primary | ICD-10-CM

## 2024-08-07 DIAGNOSIS — E66.812 OBESITY, CLASS II, BMI 35-39.9, NO COMORBIDITY: ICD-10-CM

## 2024-08-07 PROCEDURE — 99207 PR PRENATAL VISIT: CPT | Performed by: STUDENT IN AN ORGANIZED HEALTH CARE EDUCATION/TRAINING PROGRAM

## 2024-08-07 NOTE — NURSING NOTE
"Initial /76 (BP Location: Right arm, Patient Position: Chair, Cuff Size: Adult Regular)   Pulse 75   Temp 97.8  F (36.6  C) (Tympanic)   Resp 18   Ht 1.6 m (5' 3\")   Wt 98.4 kg (217 lb)   LMP 02/26/2024   BMI 38.44 kg/m   Estimated body mass index is 38.44 kg/m  as calculated from the following:    Height as of this encounter: 1.6 m (5' 3\").    Weight as of this encounter: 98.4 kg (217 lb). .    "

## 2024-08-07 NOTE — PROGRESS NOTES
"[facility]  Return OB Note    Subjective:  Denies vaginal bleeding, loss of fluid, or regular contractions. Noted normal fetal movement.  Complaints today: none    Objective:  /76 (BP Location: Right arm, Patient Position: Chair, Cuff Size: Adult Regular)   Pulse 75   Temp 97.8  F (36.6  C) (Tympanic)   Resp 18   Ht 1.6 m (5' 3\")   Wt 98.4 kg (217 lb)   LMP 2024   BMI 38.44 kg/m      Fundal height: 24cm  FHT: 150bpm    Assessment/Plan:   Joann Toscano is a 29 year old  at 23w2d by LMP c/w 10w4d US , here for return OB visit.    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 FOB was negative.    -Obesity: pre pregnancy BMI 37. Baseline hba1c WNL. Plan growth US at 32 weeks, weekly BPP starting at 37 weeks - need to order    RTC 4 weeks    Yin Esqueda MD  Obstetrics and Gynecology  [facility]   2024         "

## 2024-08-29 ENCOUNTER — PRENATAL OFFICE VISIT (OUTPATIENT)
Dept: OBGYN | Facility: CLINIC | Age: 29
End: 2024-08-29
Payer: COMMERCIAL

## 2024-08-29 VITALS
BODY MASS INDEX: 38.97 KG/M2 | SYSTOLIC BLOOD PRESSURE: 112 MMHG | TEMPERATURE: 97.4 F | WEIGHT: 220 LBS | DIASTOLIC BLOOD PRESSURE: 80 MMHG | HEART RATE: 75 BPM

## 2024-08-29 DIAGNOSIS — Z34.02 ENCOUNTER FOR PRENATAL CARE IN SECOND TRIMESTER OF FIRST PREGNANCY: Primary | ICD-10-CM

## 2024-08-29 LAB
ERYTHROCYTE [DISTWIDTH] IN BLOOD BY AUTOMATED COUNT: 13 % (ref 10–15)
GLUCOSE 1H P 50 G GLC PO SERPL-MCNC: 84 MG/DL (ref 70–129)
HCT VFR BLD AUTO: 33.9 % (ref 35–47)
HGB BLD-MCNC: 11.3 G/DL (ref 11.7–15.7)
MCH RBC QN AUTO: 29.8 PG (ref 26.5–33)
MCHC RBC AUTO-ENTMCNC: 33.3 G/DL (ref 31.5–36.5)
MCV RBC AUTO: 89 FL (ref 78–100)
PLATELET # BLD AUTO: 268 10E3/UL (ref 150–450)
RBC # BLD AUTO: 3.79 10E6/UL (ref 3.8–5.2)
T PALLIDUM AB SER QL: NONREACTIVE
WBC # BLD AUTO: 9.6 10E3/UL (ref 4–11)

## 2024-08-29 PROCEDURE — 36415 COLL VENOUS BLD VENIPUNCTURE: CPT | Performed by: OBSTETRICS & GYNECOLOGY

## 2024-08-29 PROCEDURE — 99207 PR PRENATAL VISIT: CPT | Performed by: OBSTETRICS & GYNECOLOGY

## 2024-08-29 PROCEDURE — 85027 COMPLETE CBC AUTOMATED: CPT | Performed by: OBSTETRICS & GYNECOLOGY

## 2024-08-29 PROCEDURE — 82950 GLUCOSE TEST: CPT | Performed by: OBSTETRICS & GYNECOLOGY

## 2024-08-29 PROCEDURE — 86780 TREPONEMA PALLIDUM: CPT | Performed by: OBSTETRICS & GYNECOLOGY

## 2024-08-29 NOTE — PROGRESS NOTES
[facility]  Return OB Note     Subjective:  Denies vaginal bleeding, loss of fluid, or regular contractions. Noted normal fetal movement.  Complaints today: none     Objective:  /80 (BP Location: Left arm, Patient Position: Chair, Cuff Size: Adult Regular)   Pulse 75   Temp 97.4  F (36.3  C) (Tympanic)   Wt 99.8 kg (220 lb)   LMP 2024   BMI 38.97 kg/m       Fundal height: 26   FHT: 140s   Assessment/Plan:     Joann Toscano is a 29 year old  at 26w3d by LMP c/w 10w4d US , here for return OB visit.     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 FOB was negative.    -Obesity: pre pregnancy BMI 37. Baseline hba1c WNL. Plan growth US at 32 weeks, weekly BPP starting at 37 weeks - growth US ordered     RTC 3-4 weeks    Giovana Brandt MD  2024 8:55 AM

## 2024-08-29 NOTE — PROGRESS NOTES
"Patient completed the 1 hour glucose drink at 08:35am . Instructed patient to check in at the lab immediately after their provider visit.  Blood draw needs to be completed exactly one hour after finishing the drink.  Lab staff informed of this time through the router slip.      Patient was given QR code to scan and watch \"Anesthetic options for pain relief during labor\" video.     Patient in agreement with plan.    Opal Lane MA on 8/29/2024 at 8:36 AM    "

## 2024-08-29 NOTE — NURSING NOTE
"Initial /80 (BP Location: Left arm, Patient Position: Chair, Cuff Size: Adult Regular)   Pulse 75   Temp 97.4  F (36.3  C) (Tympanic)   Wt 99.8 kg (220 lb)   LMP 02/26/2024   BMI 38.97 kg/m   Estimated body mass index is 38.97 kg/m  as calculated from the following:    Height as of 8/7/24: 1.6 m (5' 3\").    Weight as of this encounter: 99.8 kg (220 lb). .      Opal Lane MA    "

## 2024-09-26 NOTE — PATIENT INSTRUCTIONS
Weeks 26 to 30 of Your Pregnancy: Care Instructions  You're starting your last trimester. You'll probably feel your baby moving around more. Your back may ache as your body gets used to your baby's size and length. Take care of yourself, and pay attention to what your body needs.    Talk to your doctor about getting the Tdap shot. It will help protect your  against whooping cough (pertussis). Also ask your doctor about flu and COVID-19 shots if you haven't had them yet. If your blood type is Rh negative, you may be given a shot of Rh immune globulin (such as RhoGAM). It can help prevent problems for your baby.   You may have [place] contractions. They are single or several strong contractions without a pattern. These are practice contractions but not the start of labor.   Be kind to yourself.       Take breaks when you're tired.  Change positions often. Don't sit for too long or stand for too long.  At work, rest during breaks if you can. If you don't get breaks, talk to your doctor about writing a letter to your employer to request them.  Avoid fumes, chemicals, and tobacco smoke.  Be sexual if you want to.       You may be interested in sex, or you may not. Everyone is different.  Sex is okay unless your doctor tells you not to.  Your belly can make it hard to find good positions for sex. [place] and explore.  Watch for signs of  labor.        These signs include:  Menstrual-like cramps. Or you may have pain or pressure in your pelvis that happens in a pattern.  About 6 or more contractions in an hour (even after rest and a glass of water).  A low, dull backache that doesn't go away when you change positions.  An increase or change in vaginal discharge.  Light vaginal bleeding or spotting.  Your water breaking.  Know what to do if you think you are having contractions.       Drink 1 or 2 glasses of water.  Lie down on your left side for at least an hour.  While on your side, feel the top of  "your belly to see if it's tight.  Write down your contractions for an hour. Time how long it is from the start of one contraction to the start of the next.  Call your doctor if you have regular contractions.  Follow-up care is a key part of your treatment and safety. Be sure to make and go to all appointments, and call your doctor if you are having problems. It's also a good idea to know your test results and keep a list of the medicines you take.  Where can you learn more?  Go to https://www.ReplySend.net/patiented  Enter S999 in the search box to learn more about \"Weeks 26 to 30 of Your Pregnancy: Care Instructions.\"  Current as of: July 10, 2023  Content Version: 14.2 2024 [employer].   Care instructions adapted under license by your healthcare professional. If you have questions about a medical condition or this instruction, always ask your healthcare professional. [employer] disclaims any warranty or liability for your use of this information.    Counting Your Baby's Kicks: Care Instructions  Overview     Counting your baby's kicks is one way your doctor can tell that your baby is healthy. You will probably feel your baby move for the first time between 16 and 22 weeks. The movement may feel like flutters rather than kicks. Your baby may move more at certain times of the day. When you are active, you may notice less kicking than when you are resting. At your prenatal visits, your doctor will ask whether the baby is active.  In your last trimester, your doctor may ask you to count the number of times you feel your baby move.  Follow-up care is a key part of your treatment and safety. Be sure to make and go to all appointments, and call your doctor if you are having problems. It's also a good idea to know your test results and keep a list of the medicines you take.  How do you count fetal kicks?  A common method of checking your baby's movement is to note the length of time it takes " "to count 10 movements (such as kicks, flutters, or rolls).  Pick your baby's most active time of day to count. This may be any time from morning to evening.  If you don't feel 10 movements in an hour, have something to eat or drink and count for another hour. If you don't feel at least 10 movements in the 2-hour period, call your doctor.  Do not use an at-home Doppler heart monitor in place of counting fetal movements.  When should you call for help?   Call your doctor now or seek immediate medical care if:    You feel fewer than 10 movements in a 2-hour period.     You noticed that your baby has stopped moving or is moving less than normal.   Watch closely for changes in your health, and be sure to contact your doctor if you have any problems.  Where can you learn more?  Go to https://www.[employer].GuidePal/patiented  Enter U048 in the search box to learn more about \"Counting Your Baby's Kicks: Care Instructions.\"  Current as of: July 10, 2023               Content Version: 14.0    [id number] [employer].   Care instructions adapted under license by your healthcare professional. If you have questions about a medical condition or this instruction, always ask your healthcare professional. [employer] disclaims any warranty or liability for your use of this information.        Weeks 26 to 30 of Your Pregnancy: Care Instructions  You're starting your last trimester. You'll probably feel your baby moving around more. Your back may ache as your body gets used to your baby's size and length. Take care of yourself, and pay attention to what your body needs.    Talk to your doctor about getting the Tdap shot. It will help protect your  against whooping cough (pertussis). Also ask your doctor about flu and COVID-19 shots if you haven't had them yet. If your blood type is Rh negative, you may be given a shot of Rh immune globulin (such as RhoGAM). It can help prevent problems for your baby.   You " "may have [place] contractions. They are single or several strong contractions without a pattern. These are practice contractions but not the start of labor.   Be kind to yourself.       Take breaks when you're tired.  Change positions often. Don't sit for too long or stand for too long.  At work, rest during breaks if you can. If you don't get breaks, talk to your doctor about writing a letter to your employer to request them.  Avoid fumes, chemicals, and tobacco smoke.  Be sexual if you want to.       You may be interested in sex, or you may not. Everyone is different.  Sex is okay unless your doctor tells you not to.  Your belly can make it hard to find good positions for sex. [place] and explore.  Watch for signs of  labor.        These signs include:  Menstrual-like cramps. Or you may have pain or pressure in your pelvis that happens in a pattern.  About 6 or more contractions in an hour (even after rest and a glass of water).  A low, dull backache that doesn't go away when you change positions.  An increase or change in vaginal discharge.  Light vaginal bleeding or spotting.  Your water breaking.  Know what to do if you think you are having contractions.       Drink 1 or 2 glasses of water.  Lie down on your left side for at least an hour.  While on your side, feel the top of your belly to see if it's tight.  Write down your contractions for an hour. Time how long it is from the start of one contraction to the start of the next.  Call your doctor if you have regular contractions.  Follow-up care is a key part of your treatment and safety. Be sure to make and go to all appointments, and call your doctor if you are having problems. It's also a good idea to know your test results and keep a list of the medicines you take.  Where can you learn more?  Go to https://www.healthwise.net/patiented  Enter S912 in the search box to learn more about \"Weeks 26 to 30 of Your Pregnancy: Care " "Instructions.\"  Current as of: July 10, 2023  Content Version: 14.2 2024 [facility] [email or web address].   Care instructions adapted under license by your healthcare professional. If you have questions about a medical condition or this instruction, always ask your healthcare professional. Healthwise, Incorporated disclaims any warranty or liability for your use of this information.    Weeks 30 to 32 of Your Pregnancy: Care Instructions  Your baby is growing more every day. Its eyes can open and close, and it may have hair on its head. Your baby may sleep 20 to 45 minutes at a time and is more active at certain times.    You should feel your baby move several times every day. Your baby now turns less and kicks more.   This is a good time to tour your hospital or birthing center. You may also want to find childcare if needed.         To ease heartburn   Avoid foods that make your symptoms worse, such as chocolate, spicy foods, and caffeine.  Avoid bending over or lying down after meals.  Do not eat for 2 hours before bedtime.  Take antacids like Tums, but don't take ones that have sodium bicarbonate, magnesium trisilicate, or aspirin.        To care for large, swollen veins (varicose veins)   Try to avoid standing for long periods of time.  Sit with your feet propped up.  Wear support hose.  Get some exercise every day, like walking or swimming.  Counting your baby's kicks  Your doctor may ask you to count your baby's movements, such as kicks, flutters, or rolls.    Find a quiet place, and get comfortable. Write down your start time. Count your baby's movements (except hiccups). When your baby has moved 10 times, you can stop counting. Write down how many minutes it took.   If an hour goes by and you don't feel 10 movements, have something to eat or drink. Count for another hour. If you don't feel at least 10 movements in the 2-hour period, call your doctor.   Follow-up care is a key part of your treatment and safety. Be " "sure to make and go to all appointments, and call your doctor if you are having problems. It's also a good idea to know your test results and keep a list of the medicines you take.  Where can you learn more?  Go to https://www.hiredMYway.com.net/patiented  Enter X471 in the search box to learn more about \"Weeks 30 to 32 of Your Pregnancy: Care Instructions.\"  Current as of: July 10, 2023  Content Version: 14.2 2024 [facility] [email or web address].   Care instructions adapted under license by your healthcare professional. If you have questions about a medical condition or this instruction, always ask your healthcare professional. [employer] disclaims any warranty or liability for your use of this information.    Learning About Birth Control After Childbirth  What is birth control?  Birth control is any method used to prevent pregnancy. If you have vaginal sex without birth control, you could get pregnant--even if you haven't started having periods again. You're less likely to get pregnant while breastfeeding, but it's still possible. Finding birth control that works for you can help avoid an unplanned pregnancy.  There are many kinds of birth control. Each has pros and cons. Find what works for you. Talk to your doctor if you've just given birth or are breastfeeding.    Long-acting reversible contraception (LARC). These are placed inside your body by a doctor. They can prevent pregnancy for years.  Examples include:  An implant (hormonal).  Copper intrauterine device (IUD).  Hormonal IUDs.   Short-acting hormonal methods. These release hormones. Examples include:  Combination birth control pills (\"the pill\").  Skin patches.  A vaginal ring.  A shot.  Mini-pills. Choose progestin-only options soon after giving birth.     Barrier methods. Use these every time you have vaginal sex.  Examples include:  External (male) condoms.  Internal (female) condoms.  Diaphragms.  Cervical caps.  Sponges.   Spermicides. These " "kill sperm or stop sperm from moving. They can be gels, creams, foams, films, or tablets. Use them before vaginal sex.  Examples include:  Nonoxynol-9.  pH regulator gel.     Permanent birth control (sterilization). This can be an option if you're sure that you don't want to get pregnant later.  Examples include:  Vasectomy.  Having tubes tied (tubal ligation).   Emergency contraception. This is a backup method. Use it if you didn't use birth control or your birth control method failed.  Examples include:  Copper and hormonal IUDs.  Emergency contraceptive pills.     Fertility awareness. You'll learn when you are most likely to become pregnant (are fertile). You can avoid vaginal sex at that time.  It's also called:  Natural family planning.  The rhythm method.   Breastfeeding. This is most effective when all of these are true:  Your baby is younger than 6 months old.  You're breastfeeding and not bottle-feeding at all.  You aren't having periods.   Follow-up care is a key part of your treatment and safety. Be sure to make and go to all appointments, and call your doctor if you are having problems. It's also a good idea to know your test results and keep a list of the medicines you take.  Where can you learn more?  Go to https://www.i-nexus.net/patiented  Enter X408 in the search box to learn more about \"Learning About Birth Control After Childbirth.\"  Current as of: July 10, 2023  Content Version: 14.2 2024 [facility] [employer].   Care instructions adapted under license by your healthcare professional. If you have questions about a medical condition or this instruction, always ask your healthcare professional. Healthwise, Incorporated disclaims any warranty or liability for your use of this information.    "

## 2024-09-27 ENCOUNTER — PRENATAL OFFICE VISIT (OUTPATIENT)
Dept: OBGYN | Facility: CLINIC | Age: 29
End: 2024-09-27
Payer: COMMERCIAL

## 2024-09-27 VITALS
DIASTOLIC BLOOD PRESSURE: 84 MMHG | SYSTOLIC BLOOD PRESSURE: 122 MMHG | BODY MASS INDEX: 39.5 KG/M2 | HEART RATE: 90 BPM | TEMPERATURE: 97.1 F | WEIGHT: 223 LBS

## 2024-09-27 DIAGNOSIS — Z23 NEED FOR PROPHYLACTIC VACCINATION AND INOCULATION AGAINST INFLUENZA: Primary | ICD-10-CM

## 2024-09-27 PROCEDURE — 90656 IIV3 VACC NO PRSV 0.5 ML IM: CPT | Performed by: OBSTETRICS & GYNECOLOGY

## 2024-09-27 PROCEDURE — 90472 IMMUNIZATION ADMIN EACH ADD: CPT | Performed by: OBSTETRICS & GYNECOLOGY

## 2024-09-27 PROCEDURE — 90471 IMMUNIZATION ADMIN: CPT | Performed by: OBSTETRICS & GYNECOLOGY

## 2024-09-27 PROCEDURE — 90715 TDAP VACCINE 7 YRS/> IM: CPT | Performed by: OBSTETRICS & GYNECOLOGY

## 2024-09-27 PROCEDURE — 99207 PR PRENATAL VISIT: CPT | Performed by: OBSTETRICS & GYNECOLOGY

## 2024-09-27 NOTE — NURSING NOTE
"Initial /84 (BP Location: Right arm, Patient Position: Chair, Cuff Size: Adult Regular)   Pulse 90   Temp 97.1  F (36.2  C) (Tympanic)   Wt 101.2 kg (223 lb)   LMP 02/26/2024   BMI 39.50 kg/m   Estimated body mass index is 39.5 kg/m  as calculated from the following:    Height as of 8/7/24: 1.6 m (5' 3\").    Weight as of this encounter: 101.2 kg (223 lb). .      Opal Lane MA    "

## 2024-09-27 NOTE — PROGRESS NOTES
[facility]  Return OB Note     Subjective:  Denies vaginal bleeding, loss of fluid, or regular contractions. Noted normal fetal movement.  Complaints today: none     Objective:  /84 (BP Location: Right arm, Patient Position: Chair, Cuff Size: Adult Regular)   Pulse 90   Temp 97.1  F (36.2  C) (Tympanic)   Wt 101.2 kg (223 lb)   LMP 2024   BMI 39.50 kg/m        Fundal height: 30   FHT: 140s     Assessment/Plan:     Joann Toscano is a 29 year old  at 30w4d by LMP c/w 10w4d US , here for return OB visit.     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 FOB was negative.    -Obesity: pre pregnancy BMI 37. Baseline hba1c WNL. Plan growth US at 32 weeks, weekly BPP starting at 37 weeks - growth US ordered     RTC 2 weeks     Giovana Brandt MD  2024 9:28 AM

## 2024-10-07 ENCOUNTER — HOSPITAL ENCOUNTER (OUTPATIENT)
Dept: ULTRASOUND IMAGING | Facility: CLINIC | Age: 29
Discharge: HOME | End: 2024-10-07
Attending: OBSTETRICS & GYNECOLOGY | Admitting: OBSTETRICS & GYNECOLOGY
Payer: COMMERCIAL

## 2024-10-07 ENCOUNTER — PRENATAL OFFICE VISIT (OUTPATIENT)
Dept: OBGYN | Facility: CLINIC | Age: 29
End: 2024-10-07
Payer: COMMERCIAL

## 2024-10-07 VITALS
TEMPERATURE: 98 F | HEART RATE: 85 BPM | DIASTOLIC BLOOD PRESSURE: 78 MMHG | WEIGHT: 223 LBS | SYSTOLIC BLOOD PRESSURE: 124 MMHG | BODY MASS INDEX: 39.51 KG/M2 | RESPIRATION RATE: 18 BRPM | HEIGHT: 63 IN

## 2024-10-07 DIAGNOSIS — O99.210 OBESITY IN PREGNANCY: ICD-10-CM

## 2024-10-07 DIAGNOSIS — Z34.02 ENCOUNTER FOR PRENATAL CARE IN SECOND TRIMESTER OF FIRST PREGNANCY: ICD-10-CM

## 2024-10-07 DIAGNOSIS — Z34.93 PRENATAL CARE IN THIRD TRIMESTER: Primary | ICD-10-CM

## 2024-10-07 PROCEDURE — 90678 RSV VACC PREF BIVALENT IM: CPT | Performed by: ADVANCED PRACTICE MIDWIFE

## 2024-10-07 PROCEDURE — 99207 PR PRENATAL VISIT: CPT | Performed by: ADVANCED PRACTICE MIDWIFE

## 2024-10-07 PROCEDURE — 90471 IMMUNIZATION ADMIN: CPT | Performed by: ADVANCED PRACTICE MIDWIFE

## 2024-10-07 PROCEDURE — 76816 OB US FOLLOW-UP PER FETUS: CPT

## 2024-10-07 NOTE — PROGRESS NOTES
Here with partner.  Feeling well.  Baby is active. Denies any leaking of fluid, vaginal bleeding, regular uterine contractions, or headaches or other concerns.  Obesity in pregnancy.  They just had a growth US.  41%tile.  They will have BPPs starting at 37 weeks.   RSV vaccine recommended and accepted.     Reviewed to call for contractions, loss of fluid, vaginal bleeding, decreased fetal movement or any other questions or concerns.    RTC in 2 weeks.  Liz Lazaro, PRIYANK, APRN, CNM

## 2024-10-07 NOTE — NURSING NOTE
"Initial /78 (BP Location: Right arm, Patient Position: Chair, Cuff Size: Adult Regular)   Pulse 85   Temp 98  F (36.7  C) (Tympanic)   Resp 18   Ht 1.6 m (5' 3\")   Wt 101.2 kg (223 lb)   LMP 02/26/2024   BMI 39.50 kg/m   Estimated body mass index is 39.5 kg/m  as calculated from the following:    Height as of this encounter: 1.6 m (5' 3\").    Weight as of this encounter: 101.2 kg (223 lb). .    "

## 2024-10-21 NOTE — PATIENT INSTRUCTIONS
"Weeks 32 to 34 of Your Pregnancy: Care Instructions    Decide whether you want to bank or donate your baby's umbilical cord blood. If you want to save this blood, you have to arrange for it ahead of time.   Decide about circumcision. Personal, Confucianist, or cultural beliefs may play a role in your decision. You get to decide what you want for your baby.         Learn how to ease hemorrhoids.   Get more liquids, fruits, vegetables, and fiber in your diet.  Avoid sitting for too long.  Clean yourself with moist toilet paper. Or try witch hazel pads.  Try ice packs or warm sitz baths for discomfort.  Use hydrocortisone cream for pain or itching.  Ask your doctor about stool softeners.        Consider the benefits of breastfeeding.   It reduces your baby's risk of sudden infant death syndrome (SIDS).   babies are less likely to get certain infections. And they're less likely to be obese or get diabetes later in life.  It can lower your risk of breast and ovarian cancers and osteoporosis.  It saves you money.  Follow-up care is a key part of your treatment and safety. Be sure to make and go to all appointments, and call your doctor if you are having problems. It's also a good idea to know your test results and keep a list of the medicines you take.  Where can you learn more?  Go to https://www.Cute Attack.net/patiented  Enter X711 in the search box to learn more about \"Weeks 32 to 34 of Your Pregnancy: Care Instructions.\"  Current as of: July 10, 2023  Content Version: 14.2 2024 [facility].   Care instructions adapted under license by your healthcare professional. If you have questions about a medical condition or this instruction, always ask your healthcare professional. Healthwise, Incorporated disclaims any warranty or liability for your use of this information.    You have been provided the Any Day Now: Early Labor at Home document.    Additional copies can be found here:  " [email or web address]  You have been provided the What I'd Wish I'd Known About Giving Birth document.    Additional copies can be found here:  [email or web address]  Weeks 34 to 36 of Your Pregnancy: Care Instructions  Your belly has grown quite large. It's almost time to give birth! Your baby's lungs are almost ready to breathe air. The skull bones are firm enough to protect your baby's head. But they're soft enough to move down through the birth canal.    You might be wondering what to expect during labor. Because each birth is different, there's no way to know exactly what childbirth will be like for you. Talk to your doctor or midwife about any concerns you have.   You'll probably have a test for group B streptococcus (GBS). GBS is bacteria that can live in the vagina and rectum. GBS can make your baby sick after birth. If you test positive, you'll get antibiotics during labor.     Choose what type of pain relief you would like during labor.  You can choose from a few types, including medicine and non-medicine options. You may want to use several types of pain relief.     Know how medicines can help with pain during labor.  Some medicines lower anxiety and help with some of the pain. Others make your lower body numb so that you will feel less pain.     Tell your doctor about your pain medicine choice.  Do this before you start labor or very early in your labor. You may be able to change your mind during labor.     Learn about the stages of labor.    The first stage includes the early (latent) and active phases of labor. Contractions start in early labor. During active labor, contractions get stronger, last longer, and happen more often. And the cervix opens more rapidly.  The second stage starts when you're ready to push. During this stage, your baby is born.  During the third stage, you'll usually have a few more contractions to push out the placenta.   Follow-up care is a key part of your treatment  "and safety. Be sure to make and go to all appointments, and call your doctor if you are having problems. It's also a good idea to know your test results and keep a list of the medicines you take.  Where can you learn more?  Go to https://www.CareKinesis.net/patiented  Enter B912 in the search box to learn more about \"Weeks 34 to 36 of Your Pregnancy: Care Instructions.\"  Current as of: July 10, 2023  Content Version: 2024 [employer].   Care instructions adapted under license by your healthcare professional. If you have questions about a medical condition or this instruction, always ask your healthcare professional. Healthwise, Incorporated disclaims any warranty or liability for your use of this information.    Group B Strep During Pregnancy: Care Instructions  Overview     Group B strep infection is caused by a type of bacteria. It's a different kind of bacteria than the kind that causes strep throat.  You may have this kind of bacteria in your body. Sometimes it may cause an infection, but most of the time it doesn't make you sick or cause symptoms. But if you pass the bacteria to your baby during the birth, it can cause serious health problems for your baby.  If you have this bacteria in your body, you will get antibiotics when you are in labor. Antibiotics help prevent problems for a  baby.  After birth, doctors will watch and may test your baby. If your baby tests positive for Group B strep, your baby will get antibiotics.  If you plan to breastfeed your baby, don't worry. It will be safe to breastfeed.  Follow-up care is a key part of your treatment and safety. Be sure to make and go to all appointments, and call your doctor if you are having problems. It's also a good idea to know your test results and keep a list of the medicines you take.  How can you care for yourself at home?  If your doctor has prescribed antibiotics, take them as directed. Do not stop taking them just because " "you feel better. You need to take the full course of antibiotics.  Tell your doctor if you are allergic to any antibiotic.  If you go into labor, or your water breaks, go to the hospital. Your doctor will give you antibiotics to help protect your baby from infection.  Tell the doctors and nurses if you have an allergy to penicillin.  Tell the doctors and nurses at the hospital that you tested positive for group B strep.  When should you call for help?   Call your doctor now or seek immediate medical care if:    You have symptoms of a urinary tract infection. These may include:  Pain or burning when you urinate.  A frequent need to urinate without being able to pass much urine.  Pain in the flank, which is just below the rib cage and above the waist on either side of the back.  Blood in your urine.  A fever.     You think you are in labor or your water has broken.     You have pain in your belly or pelvis.   Watch closely for changes in your health, and be sure to contact your doctor if you have any problems.  Where can you learn more?  Go to https://www.Embedster.net/patiented  Enter M001 in the search box to learn more about \"Group B Strep During Pregnancy: Care Instructions.\"  Current as of: June 12, 2023  Content Version: 14.2 2024 [email or web address].   Care instructions adapted under license by your healthcare professional. If you have questions about a medical condition or this instruction, always ask your healthcare professional. Healthwise, Incorporated disclaims any warranty or liability for your use of this information.    Circumcision in Infants: What to Expect at Home  Your Child's Recovery  After circumcision, your baby's penis may look red and swollen. It may have petroleum jelly and gauze on it. The gauze will likely come off when your baby urinates. Follow your doctor's directions about whether to put clean gauze back on your baby's penis or to leave the gauze off. If you need to remove gauze " from the penis, use warm water to soak the gauze and gently loosen it.  The doctor may have used a Plastibell device to do the circumcision. If so, your baby will have a plastic ring around the head of the penis. The ring should fall off by itself in 10 to 12 days.  A thin, yellow film may form over the area the day after the procedure. This is part of the normal healing process. It should go away in a few days.  Your baby may seem fussy while the area heals. It may hurt for your baby to urinate. This pain often gets better in 3 or 4 days. But it may last for up to 2 weeks.  Even though your baby's penis will likely start to feel better after 3 or 4 days, it may look worse. The penis often starts to look like it's getting better after about 7 to 10 days.  This care sheet gives you a general idea about how long it will take for your child to recover. But each child recovers at a different pace. Follow the steps below to help your child get better as quickly as possible.  How can you care for your child at home?  Activity    Let your baby rest as much as possible. Sleeping will help with recovery.     You can give your baby a sponge bath the day after surgery. Ask your doctor when it is okay to give your baby a bath.   Medicines    Your doctor will tell you if and when your child can restart any medicines. The doctor will also give you instructions about your child taking any new medicines.     Your doctor may recommend giving your baby acetaminophen (Tylenol) to help with pain after the procedure. Be safe with medicines. Give your child medicines exactly as prescribed. Call your doctor if you think your child is having a problem with a medicine.     Do not give your child two or more pain medicines at the same time unless the doctor told you to. Many pain medicines have acetaminophen, which is Tylenol. Too much acetaminophen (Tylenol) can be harmful.   Circumcision care    Always wash your hands before and after  "touching the circumcision area.     Gently wash your baby's penis with plain, warm water after each diaper change, and pat it dry. Do not use soap. Don't use hydrogen peroxide or alcohol. They can slow healing.     Do not try to remove the film that forms on the penis. The film will go away on its own.     Put plenty of petroleum jelly (such as Vaseline) on the circumcision area during each diaper change. This will prevent your baby's penis from sticking to the diaper while it heals.     Fasten your baby's diapers loosely so that there is less pressure on the penis while it heals.   Follow-up care is a key part of your child's treatment and safety. Be sure to make and go to all appointments, and call your doctor if your child is having problems. It's also a good idea to know your child's test results and keep a list of the medicines your child takes.  When should you call for help?   Call your doctor now or seek immediate medical care if:    Your baby has a fever over 100.4 F.     Your baby is extremely fussy or irritable, has a high-pitched cry, or refuses to eat.     Your baby does not have a wet diaper within 12 hours after the circumcision.     You find a spot of bleeding larger than a 2-inch [place] from the incision.     Your baby has signs of infection. Signs may include severe swelling; redness; a red streak on the shaft of the penis; or a thick, yellow discharge.   Watch closely for changes in your child's health, and be sure to contact your doctor if:    A Plastibell device was used for the circumcision and the ring has not fallen off after 10 to 12 days.   Where can you learn more?  Go to https://www.GoCoop.net/patiented  Enter S255 in the search box to learn more about \"Circumcision in Infants: What to Expect at Home.\"  Current as of: October 24, 2023  Content Version: 14.2 2024 [employer].   Care instructions adapted under license by your healthcare professional. If you have questions " about a medical condition or this instruction, always ask your healthcare professional. Healthwise, Incorporated disclaims any warranty or liability for your use of this information.

## 2024-10-22 ENCOUNTER — PRENATAL OFFICE VISIT (OUTPATIENT)
Dept: OBGYN | Facility: CLINIC | Age: 29
End: 2024-10-22
Payer: COMMERCIAL

## 2024-10-22 VITALS
WEIGHT: 228.9 LBS | DIASTOLIC BLOOD PRESSURE: 74 MMHG | TEMPERATURE: 97 F | HEART RATE: 82 BPM | SYSTOLIC BLOOD PRESSURE: 106 MMHG | BODY MASS INDEX: 40.56 KG/M2 | HEIGHT: 63 IN | RESPIRATION RATE: 18 BRPM

## 2024-10-22 DIAGNOSIS — O99.210 OBESITY IN PREGNANCY: ICD-10-CM

## 2024-10-22 DIAGNOSIS — Z3A.34 34 WEEKS GESTATION OF PREGNANCY: Primary | ICD-10-CM

## 2024-10-22 PROCEDURE — 99207 PR PRENATAL VISIT: CPT | Performed by: OBSTETRICS & GYNECOLOGY

## 2024-10-22 NOTE — PROGRESS NOTES
"[facility]    Return OB Note    CC: Return OB     Subjective:  Joann is a 29 year old   at 34w1d   Denies vaginal bleeding, loss of fluid, or regular contractions. Pos fetal movement. No headache, visual disturbance or RUQ pain.  Complaints today: none    Objective:  /74 (BP Location: Right arm, Patient Position: Chair, Cuff Size: Adult Large)   Pulse 82   Temp 97  F (36.1  C) (Tympanic)   Resp 18   Ht 1.6 m (5' 3\")   Wt 103.8 kg (228 lb 14.4 oz)   LMP 2024   BMI 40.55 kg/m      See flowsheet      Assessment/Plan:       29 year old year old  female with IUP at 34w1d  Encounter Diagnoses   Name Primary?    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 FOB was negative.    -Obesity: pre pregnancy BMI 37. Baseline hba1c WNL. Plan growth US at 32 weeks, weekly BPP starting at 37 weeks, nml growth 10/7, scans ordered.    Return precautions given  Discussed labor, rupture of membranes and preeclampsia precautions.  Discussed fetal movement precautions.    RTC 2 weeks    Jessica Whitlock MD   "

## 2024-10-22 NOTE — NURSING NOTE
"Initial /74 (BP Location: Right arm, Patient Position: Chair, Cuff Size: Adult Large)   Pulse 82   Temp 97  F (36.1  C) (Tympanic)   Resp 18   Ht 1.6 m (5' 3\")   Wt 103.8 kg (228 lb 14.4 oz)   LMP 02/26/2024   BMI 40.55 kg/m   Estimated body mass index is 40.55 kg/m  as calculated from the following:    Height as of this encounter: 1.6 m (5' 3\").    Weight as of this encounter: 103.8 kg (228 lb 14.4 oz). .    Mirlande Rodriguez, ISABEL    "

## 2024-10-30 NOTE — PATIENT INSTRUCTIONS
"Week 37 of Your Pregnancy: Care Instructions    Most babies are born between 37 and 40 weeks.   This is a good time to pack a bag to take with you to the birth. Then it will be ready to go when you are.     Learn about breastfeeding.  For example, find out about ways to hold your baby to make breastfeeding easier. And think about learning how to pump and store milk.     Know that crying is normal.  It's common for babies to cry 1 to 3 hours a day. Some cry more, and some cry less.     Learn why babies cry.  They may be hungry; have gas; need a diaper change; or feel cold, warm, tired, lonely, or tense. Sometimes they cry for unknown reasons.     Think about what will help you stay calm when your baby cries.  Taking slow, deep breaths can help. So can taking a break. It's okay to put your baby somewhere safe (like their crib) and walk away for a few minutes.     Learn about safe sleep for your baby.  Always put your baby to sleep on their back. Place them alone in a crib or bassinet with a firm, flat surface. Keep soft items like stuffed animals out of the crib.     Learn what to expect with  poop.  Your baby will have their own bowel patterns. Some babies have several bowel movements a day. Some have fewer.     Know that  babies will often have loose, yellow bowel movements.  Formula-fed babies have more formed stools. If your baby's poop looks like pellets, your baby is constipated.   Follow-up care is a key part of your treatment and safety. Be sure to make and go to all appointments, and call your doctor if you are having problems. It's also a good idea to know your test results and keep a list of the medicines you take.  Where can you learn more?  Go to https://www.GrupHediye.net/patiented  Enter N257 in the search box to learn more about \"Week 37 of Your Pregnancy: Care Instructions.\"  Current as of: July 10, 2023  Content Version: 2024 [facility] [employer].   Care instructions adapted " under license by your healthcare professional. If you have questions about a medical condition or this instruction, always ask your healthcare professional. Healthwise, Incorporated disclaims any warranty or liability for your use of this information.

## 2024-11-04 PROBLEM — O99.213 OBESITY AFFECTING PREGNANCY IN THIRD TRIMESTER, UNSPECIFIED OBESITY TYPE: Status: ACTIVE | Noted: 2024-11-04

## 2024-11-04 NOTE — PROGRESS NOTES
Return OB-- 36w1d    S: Feels OK.  Fetus active. Occasional B-H contraction. No LOF, VB.    O: See flowsheet    Growth US/BPP today: EFW 67%ile, cephalic, normal fluid. BPP .  .    GBS sent.     A/P: 29 year old  @ 36w1d  -NOB labs noted for rubella and hep B non immune (has allergy to MMR)  -Prenatal screening: NIPT low risk, AFP declined  -Anatomy US normal, possible placental lakes, 32wk growth US 41%ile.  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 FOB was negative.    -Obesity: pre pregnancy BMI 37. Baseline hba1c WNL. 32wk growth 41%ile, weekly BPP starting 36wks, first today.     Reviewed labor, FM, bleeding precautions.    RTC 1 weeks.    Herminia Garcia MD

## 2024-11-05 ENCOUNTER — PRENATAL OFFICE VISIT (OUTPATIENT)
Dept: OBGYN | Facility: CLINIC | Age: 29
End: 2024-11-05
Payer: COMMERCIAL

## 2024-11-05 ENCOUNTER — HOSPITAL ENCOUNTER (OUTPATIENT)
Dept: ULTRASOUND IMAGING | Facility: CLINIC | Age: 29
Discharge: HOME | End: 2024-11-05
Attending: OBSTETRICS & GYNECOLOGY | Admitting: OBSTETRICS & GYNECOLOGY
Payer: COMMERCIAL

## 2024-11-05 VITALS
BODY MASS INDEX: 40.45 KG/M2 | HEIGHT: 63 IN | DIASTOLIC BLOOD PRESSURE: 79 MMHG | WEIGHT: 228.3 LBS | SYSTOLIC BLOOD PRESSURE: 111 MMHG | HEART RATE: 92 BPM | TEMPERATURE: 97.3 F

## 2024-11-05 DIAGNOSIS — Z34.03 ENCOUNTER FOR PRENATAL CARE IN THIRD TRIMESTER OF FIRST PREGNANCY: Primary | ICD-10-CM

## 2024-11-05 DIAGNOSIS — O99.213 OBESITY AFFECTING PREGNANCY IN THIRD TRIMESTER, UNSPECIFIED OBESITY TYPE: ICD-10-CM

## 2024-11-05 DIAGNOSIS — Z3A.34 34 WEEKS GESTATION OF PREGNANCY: ICD-10-CM

## 2024-11-05 DIAGNOSIS — O99.210 OBESITY IN PREGNANCY: ICD-10-CM

## 2024-11-05 PROCEDURE — 99207 PR PRENATAL VISIT: CPT | Performed by: OBSTETRICS & GYNECOLOGY

## 2024-11-05 PROCEDURE — 76816 OB US FOLLOW-UP PER FETUS: CPT

## 2024-11-05 PROCEDURE — 87653 STREP B DNA AMP PROBE: CPT | Performed by: OBSTETRICS & GYNECOLOGY

## 2024-11-06 LAB — GP B STREP DNA SPEC QL NAA+PROBE: NEGATIVE

## 2024-11-10 ENCOUNTER — ANESTHESIA (OUTPATIENT)
Dept: SURGERY | Facility: CLINIC | Age: 29
End: 2024-11-10
Payer: COMMERCIAL

## 2024-11-10 ENCOUNTER — HOSPITAL ENCOUNTER (INPATIENT)
Facility: CLINIC | Age: 29
LOS: 3 days | Discharge: HOME | End: 2024-11-13
Attending: OBSTETRICS & GYNECOLOGY | Admitting: OBSTETRICS & GYNECOLOGY
Payer: COMMERCIAL

## 2024-11-10 ENCOUNTER — ANESTHESIA EVENT (OUTPATIENT)
Dept: SURGERY | Facility: CLINIC | Age: 29
End: 2024-11-10
Payer: COMMERCIAL

## 2024-11-10 PROBLEM — Z34.90 PREGNANCY: Status: ACTIVE | Noted: 2024-11-10

## 2024-11-10 PROBLEM — Z36.89 ENCOUNTER FOR TRIAGE IN PREGNANT PATIENT: Status: ACTIVE | Noted: 2024-11-10

## 2024-11-10 LAB
ABO/RH(D): NORMAL
ANTIBODY SCREEN: NEGATIVE
ERYTHROCYTE [DISTWIDTH] IN BLOOD BY AUTOMATED COUNT: 12.7 % (ref 10–15)
HCT VFR BLD AUTO: 37.1 % (ref 35–47)
HGB BLD-MCNC: 12.9 G/DL (ref 11.7–15.7)
HOLD SPECIMEN: NORMAL
MCH RBC QN AUTO: 30.5 PG (ref 26.5–33)
MCHC RBC AUTO-ENTMCNC: 34.8 G/DL (ref 31.5–36.5)
MCV RBC AUTO: 88 FL (ref 78–100)
PLATELET # BLD AUTO: 279 10E3/UL (ref 150–450)
RBC # BLD AUTO: 4.23 10E6/UL (ref 3.8–5.2)
RUPTURE OF FETAL MEMBRANES BY ROM PLUS: POSITIVE
SPECIMEN EXPIRATION DATE: NORMAL
WBC # BLD AUTO: 11.5 10E3/UL (ref 4–11)

## 2024-11-10 PROCEDURE — 84112 EVAL AMNIOTIC FLUID PROTEIN: CPT | Performed by: OBSTETRICS & GYNECOLOGY

## 2024-11-10 PROCEDURE — 86900 BLOOD TYPING SEROLOGIC ABO: CPT | Performed by: OBSTETRICS & GYNECOLOGY

## 2024-11-10 PROCEDURE — 258N000003 HC RX IP 258 OP 636

## 2024-11-10 PROCEDURE — 3E0R3BZ INTRODUCTION OF ANESTHETIC AGENT INTO SPINAL CANAL, PERCUTANEOUS APPROACH: ICD-10-PCS | Performed by: OBSTETRICS & GYNECOLOGY

## 2024-11-10 PROCEDURE — 250N000009 HC RX 250: Performed by: OBSTETRICS & GYNECOLOGY

## 2024-11-10 PROCEDURE — 120N000001 HC R&B MED SURG/OB

## 2024-11-10 PROCEDURE — 86923 COMPATIBILITY TEST ELECTRIC: CPT | Performed by: STUDENT IN AN ORGANIZED HEALTH CARE EDUCATION/TRAINING PROGRAM

## 2024-11-10 PROCEDURE — 86780 TREPONEMA PALLIDUM: CPT | Performed by: OBSTETRICS & GYNECOLOGY

## 2024-11-10 PROCEDURE — 86923 COMPATIBILITY TEST ELECTRIC: CPT | Performed by: OBSTETRICS & GYNECOLOGY

## 2024-11-10 PROCEDURE — 258N000003 HC RX IP 258 OP 636: Performed by: OBSTETRICS & GYNECOLOGY

## 2024-11-10 PROCEDURE — 00HU33Z INSERTION OF INFUSION DEVICE INTO SPINAL CANAL, PERCUTANEOUS APPROACH: ICD-10-PCS | Performed by: OBSTETRICS & GYNECOLOGY

## 2024-11-10 PROCEDURE — 250N000011 HC RX IP 250 OP 636

## 2024-11-10 PROCEDURE — 250N000011 HC RX IP 250 OP 636: Performed by: OBSTETRICS & GYNECOLOGY

## 2024-11-10 PROCEDURE — G0463 HOSPITAL OUTPT CLINIC VISIT: HCPCS

## 2024-11-10 PROCEDURE — 85014 HEMATOCRIT: CPT | Performed by: OBSTETRICS & GYNECOLOGY

## 2024-11-10 PROCEDURE — 10907ZC DRAINAGE OF AMNIOTIC FLUID, THERAPEUTIC FROM PRODUCTS OF CONCEPTION, VIA NATURAL OR ARTIFICIAL OPENING: ICD-10-PCS | Performed by: OBSTETRICS & GYNECOLOGY

## 2024-11-10 RX ORDER — LOPERAMIDE HYDROCHLORIDE 2 MG/1
4 CAPSULE ORAL
Status: DISCONTINUED | OUTPATIENT
Start: 2024-11-10 | End: 2024-11-11 | Stop reason: HOSPADM

## 2024-11-10 RX ORDER — MISOPROSTOL 200 UG/1
400 TABLET ORAL
Status: DISCONTINUED | OUTPATIENT
Start: 2024-11-10 | End: 2024-11-11 | Stop reason: HOSPADM

## 2024-11-10 RX ORDER — PROCHLORPERAZINE MALEATE 5 MG/1
10 TABLET ORAL EVERY 6 HOURS PRN
Status: DISCONTINUED | OUTPATIENT
Start: 2024-11-10 | End: 2024-11-11 | Stop reason: HOSPADM

## 2024-11-10 RX ORDER — NALOXONE HYDROCHLORIDE 0.4 MG/ML
0.2 INJECTION, SOLUTION INTRAMUSCULAR; INTRAVENOUS; SUBCUTANEOUS
Status: DISCONTINUED | OUTPATIENT
Start: 2024-11-10 | End: 2024-11-11 | Stop reason: HOSPADM

## 2024-11-10 RX ORDER — OXYTOCIN 10 [USP'U]/ML
10 INJECTION, SOLUTION INTRAMUSCULAR; INTRAVENOUS
Status: DISCONTINUED | OUTPATIENT
Start: 2024-11-10 | End: 2024-11-11

## 2024-11-10 RX ORDER — LIDOCAINE 40 MG/G
CREAM TOPICAL
Status: DISCONTINUED | OUTPATIENT
Start: 2024-11-10 | End: 2024-11-11 | Stop reason: HOSPADM

## 2024-11-10 RX ORDER — LIDOCAINE 40 MG/G
CREAM TOPICAL
Status: DISCONTINUED | OUTPATIENT
Start: 2024-11-10 | End: 2024-11-10 | Stop reason: HOSPADM

## 2024-11-10 RX ORDER — METOCLOPRAMIDE 10 MG/1
10 TABLET ORAL EVERY 6 HOURS PRN
Status: DISCONTINUED | OUTPATIENT
Start: 2024-11-10 | End: 2024-11-11 | Stop reason: HOSPADM

## 2024-11-10 RX ORDER — IBUPROFEN 800 MG/1
800 TABLET, FILM COATED ORAL
Status: DISCONTINUED | OUTPATIENT
Start: 2024-11-10 | End: 2024-11-11

## 2024-11-10 RX ORDER — FENTANYL CITRATE-0.9 % NACL/PF 10 MCG/ML
100 PLASTIC BAG, INJECTION (ML) INTRAVENOUS EVERY 5 MIN PRN
Status: DISCONTINUED | OUTPATIENT
Start: 2024-11-10 | End: 2024-11-11 | Stop reason: HOSPADM

## 2024-11-10 RX ORDER — KETOROLAC TROMETHAMINE 30 MG/ML
30 INJECTION, SOLUTION INTRAMUSCULAR; INTRAVENOUS
Status: DISCONTINUED | OUTPATIENT
Start: 2024-11-10 | End: 2024-11-11

## 2024-11-10 RX ORDER — ONDANSETRON 2 MG/ML
4 INJECTION INTRAMUSCULAR; INTRAVENOUS EVERY 6 HOURS PRN
Status: DISCONTINUED | OUTPATIENT
Start: 2024-11-10 | End: 2024-11-11 | Stop reason: HOSPADM

## 2024-11-10 RX ORDER — OXYTOCIN/0.9 % SODIUM CHLORIDE 30/500 ML
1-24 PLASTIC BAG, INJECTION (ML) INTRAVENOUS CONTINUOUS
Status: DISCONTINUED | OUTPATIENT
Start: 2024-11-10 | End: 2024-11-11 | Stop reason: HOSPADM

## 2024-11-10 RX ORDER — NALBUPHINE HYDROCHLORIDE 10 MG/ML
2.5-5 INJECTION INTRAMUSCULAR; INTRAVENOUS; SUBCUTANEOUS EVERY 6 HOURS PRN
Status: DISCONTINUED | OUTPATIENT
Start: 2024-11-10 | End: 2024-11-10

## 2024-11-10 RX ORDER — NALOXONE HYDROCHLORIDE 0.4 MG/ML
0.4 INJECTION, SOLUTION INTRAMUSCULAR; INTRAVENOUS; SUBCUTANEOUS
Status: DISCONTINUED | OUTPATIENT
Start: 2024-11-10 | End: 2024-11-11 | Stop reason: HOSPADM

## 2024-11-10 RX ORDER — METHYLERGONOVINE MALEATE 0.2 MG/ML
200 INJECTION INTRAVENOUS
Status: DISCONTINUED | OUTPATIENT
Start: 2024-11-10 | End: 2024-11-11 | Stop reason: HOSPADM

## 2024-11-10 RX ORDER — CITRIC ACID/SODIUM CITRATE 334-500MG
30 SOLUTION, ORAL ORAL
Status: DISCONTINUED | OUTPATIENT
Start: 2024-11-10 | End: 2024-11-11 | Stop reason: HOSPADM

## 2024-11-10 RX ORDER — MISOPROSTOL 200 UG/1
800 TABLET ORAL
Status: DISCONTINUED | OUTPATIENT
Start: 2024-11-10 | End: 2024-11-11 | Stop reason: HOSPADM

## 2024-11-10 RX ORDER — OXYTOCIN 10 [USP'U]/ML
10 INJECTION, SOLUTION INTRAMUSCULAR; INTRAVENOUS
Status: DISCONTINUED | OUTPATIENT
Start: 2024-11-10 | End: 2024-11-11 | Stop reason: HOSPADM

## 2024-11-10 RX ORDER — ONDANSETRON 4 MG/1
4 TABLET, ORALLY DISINTEGRATING ORAL EVERY 6 HOURS PRN
Status: DISCONTINUED | OUTPATIENT
Start: 2024-11-10 | End: 2024-11-11 | Stop reason: HOSPADM

## 2024-11-10 RX ORDER — CARBOPROST TROMETHAMINE 250 UG/ML
250 INJECTION, SOLUTION INTRAMUSCULAR
Status: DISCONTINUED | OUTPATIENT
Start: 2024-11-10 | End: 2024-11-11 | Stop reason: HOSPADM

## 2024-11-10 RX ORDER — LOPERAMIDE HYDROCHLORIDE 2 MG/1
2 CAPSULE ORAL
Status: DISCONTINUED | OUTPATIENT
Start: 2024-11-10 | End: 2024-11-11 | Stop reason: HOSPADM

## 2024-11-10 RX ORDER — OXYTOCIN/0.9 % SODIUM CHLORIDE 30/500 ML
100-340 PLASTIC BAG, INJECTION (ML) INTRAVENOUS CONTINUOUS PRN
Status: DISCONTINUED | OUTPATIENT
Start: 2024-11-10 | End: 2024-11-11

## 2024-11-10 RX ORDER — TRANEXAMIC ACID 10 MG/ML
1 INJECTION, SOLUTION INTRAVENOUS EVERY 30 MIN PRN
Status: DISCONTINUED | OUTPATIENT
Start: 2024-11-10 | End: 2024-11-11 | Stop reason: HOSPADM

## 2024-11-10 RX ORDER — METOCLOPRAMIDE HYDROCHLORIDE 5 MG/ML
10 INJECTION INTRAMUSCULAR; INTRAVENOUS EVERY 6 HOURS PRN
Status: DISCONTINUED | OUTPATIENT
Start: 2024-11-10 | End: 2024-11-11 | Stop reason: HOSPADM

## 2024-11-10 RX ORDER — OXYTOCIN/0.9 % SODIUM CHLORIDE 30/500 ML
340 PLASTIC BAG, INJECTION (ML) INTRAVENOUS CONTINUOUS PRN
Status: DISCONTINUED | OUTPATIENT
Start: 2024-11-10 | End: 2024-11-11 | Stop reason: HOSPADM

## 2024-11-10 RX ORDER — FENTANYL CITRATE 50 UG/ML
50 INJECTION, SOLUTION INTRAMUSCULAR; INTRAVENOUS EVERY 30 MIN PRN
Status: DISCONTINUED | OUTPATIENT
Start: 2024-11-10 | End: 2024-11-11

## 2024-11-10 RX ORDER — ACETAMINOPHEN 325 MG/1
650 TABLET ORAL EVERY 4 HOURS PRN
Status: DISCONTINUED | OUTPATIENT
Start: 2024-11-10 | End: 2024-11-11 | Stop reason: HOSPADM

## 2024-11-10 RX ADMIN — Medication 2 MILLI-UNITS/MIN: at 17:11

## 2024-11-10 RX ADMIN — Medication 100 MCG: at 23:51

## 2024-11-10 RX ADMIN — SODIUM CHLORIDE, POTASSIUM CHLORIDE, SODIUM LACTATE AND CALCIUM CHLORIDE 500 ML: 600; 310; 30; 20 INJECTION, SOLUTION INTRAVENOUS at 22:22

## 2024-11-10 RX ADMIN — ONDANSETRON 4 MG: 2 INJECTION INTRAMUSCULAR; INTRAVENOUS at 23:48

## 2024-11-10 RX ADMIN — Medication 100 MCG: at 23:45

## 2024-11-10 RX ADMIN — Medication 10 ML/HR: at 23:35

## 2024-11-10 RX ADMIN — SODIUM CHLORIDE, POTASSIUM CHLORIDE, SODIUM LACTATE AND CALCIUM CHLORIDE 250 ML: 600; 310; 30; 20 INJECTION, SOLUTION INTRAVENOUS at 23:45

## 2024-11-10 RX ADMIN — SODIUM CHLORIDE, POTASSIUM CHLORIDE, SODIUM LACTATE AND CALCIUM CHLORIDE 500 ML: 600; 310; 30; 20 INJECTION, SOLUTION INTRAVENOUS at 16:15

## 2024-11-10 ASSESSMENT — ACTIVITIES OF DAILY LIVING (ADL)
ADLS_ACUITY_SCORE: 0

## 2024-11-10 NOTE — PROGRESS NOTES
Data: [facility] to [facility]: 11/10/2024  3:27 PM.  Reason for maternal/fetal assessment is leaking vaginal fluid. Patient reports that she woke this morning at 0445 and felt like she was leaking fluid. Patient went back to bed and woke up at 0900 with more leaking of fluid and a gush. Patient report she has continued to leak fluid since and the color is clear pink tinged. Patient denied any vaginal bleeding or contractions. +FM.     Patient denies uterine contractions, vaginal bleeding, abdominal pain, pelvic pressure, nausea, vomiting, headache, visual disturbances, epigastric or RUQ pain, significant edema. Patient reports fetal movement is normal. Patient is a 36w6d .  Prenatal record reviewed. Pregnancy has been complicated by obesity (pre-pregnancy BMI >=35) and cystic fibrosis carrier, possible placenta lakes, and rubella and hep B non immune .    Vital signs wnl. Support person is present.     Action: Verbal consent for EFM. Triage assessment completed.     Response: Patient verbalized agreement with plan. Will contact Dr. Foreman with update and further orders.

## 2024-11-10 NOTE — PROGRESS NOTES
Initial FHR tracing with tachycardia and minimal variability.     Dr. Foreman notified.     Plan: Obtain IV access and give fluid bolus. Obtain ROM+. Check cervix.

## 2024-11-10 NOTE — H&P
Admit H&P      HPI:  Joann VELAZQUE[facility], 29 year old,  at 36w6d presents for leakage of urine.  She has had a couple of episodes of fluid leakage and is now trickling with standing.  Denies contractions.  Had some bleeding after cervical exam.  Baby has been active.  No headaches, visual disturbance or RUQ pain      Past Medical History:   Diagnosis Date    Anxiety     Depression      Patient Active Problem List   Diagnosis    Prenatal care, first pregnancy    Obesity affecting pregnancy in third trimester, unspecified obesity type    Encounter for triage in pregnant patient    Pregnancy     Past Surgical History:   Procedure Laterality Date    MOUTH SURGERY      wisdom teeth     Social History     Socioeconomic History    Marital status:      Spouse name: Not on file    Number of children: Not on file    Years of education: Not on file    Highest education level: Not on file   Occupational History    Not on file   Tobacco Use    Smoking status: Never     Passive exposure: Current    Smokeless tobacco: Never   Vaping Use    Vaping status: Never Used   Substance and Sexual Activity    Alcohol use: Not Currently     Comment: Alcoholic Drinks/day: social-quit with pregnancy    Drug use: No    Sexual activity: Yes     Partners: Male     Birth control/protection: Condom   Other Topics Concern    Parent/sibling w/ CABG, MI or angioplasty before 65F 55M? Not Asked   Social History Narrative    Not on file     Social Drivers of Health     Financial Resource Strain: Low Risk  (11/10/2024)    Financial Resource Strain     Within the past 12 months, have you or your family members you live with been unable to get utilities (heat, electricity) when it was really needed?: No   Food Insecurity: Low Risk  (11/10/2024)    Food Insecurity     Within the past 12 months, did you worry that your food would run out before you got money to buy more?: No     Within the past 12 months, did the food you bought just not last and  you didn t have money to get more?: No   Transportation Needs: Low Risk  (11/10/2024)    Transportation Needs     Within the past 12 months, has lack of transportation kept you from medical appointments, getting your medicines, non-medical meetings or appointments, work, or from getting things that you need?: No   Physical Activity: Unknown (4/11/2024)    Exercise Vital Sign     Days of Exercise per Week: 0 days     Minutes of Exercise per Session: Not on file   Stress: Stress Concern Present (4/11/2024)    [place] [employer] - Occupational Stress Questionnaire     Feeling of Stress : Rather much   Social Connections: Unknown (4/11/2024)    Social Connection and Isolation Panel [NHANES]     Frequency of Communication with Friends and Family: Not on file     Frequency of Social Gatherings with Friends and Family: Once a week     Attends Zoroastrian Services: Not on file     Active Member of Clubs or Organizations: Not on file     Attends Club or Organization Meetings: Not on file     Marital Status: Not on file   Interpersonal Safety: Unknown (11/10/2024)    Interpersonal Safety     Do you feel physically and emotionally safe where you currently live?: Patient unable to answer     Within the past 12 months, have you been hit, slapped, kicked or otherwise physically hurt by someone?: Patient unable to answer     Within the past 12 months, have you been humiliated or emotionally abused in other ways by your partner or ex-partner?: Patient unable to answer   Housing Stability: Low Risk  (11/10/2024)    Housing Stability     Do you have housing? : Yes     Are you worried about losing your housing?: No     Family History   Problem Relation Age of Onset    Anxiety Disorder Mother     Depression Mother     Anxiety Disorder Father     Depression Father     Anxiety Disorder Sister     Alcoholism Sister         recovered    Diabetes Type 2  Paternal Grandfather     Coronary Artery Disease Paternal  Grandfather         MI    Breast Cancer No family hx of         No current outpatient medications on file.        Allergies:     Allergies   Allergen Reactions    Measles And Rubella Virus Vaccine Hives        ROS:  See HPI      Physical Exam:  BP (!) 132/90 (BP Location: Left arm, Patient Position: Semi-Vega's, Cuff Size: Adult Regular)   Pulse 98   Temp 98.7  F (37.1  C) (Oral)   Resp 18   LMP 2024      General appearance: well-hydrated, A&O x 3, no apparent distress  Neck: Appropriate symmetry, thyroid not enlarged  Lungs: Equal expansion bilaterally, no accessory muscle use  Heart: No heaves or thrills. No peripheral varicosities  Skin: No rash, lesions, ulcers  Constitutional: See vitals  Abdomen: Soft, non-tender, gravid  Extremities: trace edema, no calf tenderness  Neuro: CN II-XII grossly intact  SVE: /60/-2 per RN    FHTs: 150's, positive variability, positive accels, no decels  [place]: irregular contractions        Assessment/Plan:  29 year old  at 36w6d. Her pregnancy is complicated by obesity.  She is a CF carrier.   is not a carrier.  Category 1 FHR tracing  PPROM- will admit.  GBS neg  Plan to start pitocin        Jessica Whitlock MD on 11/10/2024 at 4:40 PM

## 2024-11-10 NOTE — PROGRESS NOTES
Dr. Foreman notified ROM+ result is in and SVE.     Plan: Admit patient.     Patient was brought to room 2053 and was orientated to the room and call light accompanied by her spouse, Isaak.    Admission completed.   Labs collected.     Report given to MALINDA Lawton.

## 2024-11-11 ENCOUNTER — APPOINTMENT (OUTPATIENT)
Dept: ULTRASOUND IMAGING | Facility: CLINIC | Age: 29
End: 2024-11-11
Attending: OBSTETRICS & GYNECOLOGY
Payer: COMMERCIAL

## 2024-11-11 LAB
ALBUMIN SERPL BCG-MCNC: 3.3 G/DL (ref 3.5–5.2)
ALP SERPL-CCNC: 109 U/L (ref 40–150)
ALT SERPL W P-5'-P-CCNC: 7 U/L (ref 0–50)
ANION GAP SERPL CALCULATED.3IONS-SCNC: 13 MMOL/L (ref 7–15)
APTT PPP: 24 SECONDS (ref 22–38)
APTT PPP: 25 SECONDS (ref 22–38)
AST SERPL W P-5'-P-CCNC: 18 U/L (ref 0–45)
BILIRUB SERPL-MCNC: 0.2 MG/DL
BLD PROD TYP BPU: NORMAL
BLOOD COMPONENT TYPE: NORMAL
BUN SERPL-MCNC: 5.2 MG/DL (ref 6–20)
CALCIUM SERPL-MCNC: 9 MG/DL (ref 8.8–10.4)
CHLORIDE SERPL-SCNC: 101 MMOL/L (ref 98–107)
CODING SYSTEM: NORMAL
CREAT SERPL-MCNC: 0.78 MG/DL (ref 0.51–0.95)
CROSSMATCH: NORMAL
EGFRCR SERPLBLD CKD-EPI 2021: >90 ML/MIN/1.73M2
ERYTHROCYTE [DISTWIDTH] IN BLOOD BY AUTOMATED COUNT: 12.6 % (ref 10–15)
ERYTHROCYTE [DISTWIDTH] IN BLOOD BY AUTOMATED COUNT: 12.8 % (ref 10–15)
FIBRINOGEN PPP-MCNC: 469 MG/DL (ref 170–510)
FIBRINOGEN PPP-MCNC: 506 MG/DL (ref 170–510)
GLUCOSE SERPL-MCNC: 112 MG/DL (ref 70–99)
HCO3 SERPL-SCNC: 19 MMOL/L (ref 22–29)
HCT VFR BLD AUTO: 30.8 % (ref 35–47)
HCT VFR BLD AUTO: 31.3 % (ref 35–47)
HGB BLD-MCNC: 10.7 G/DL (ref 11.7–15.7)
HGB BLD-MCNC: 10.7 G/DL (ref 11.7–15.7)
HOLD SPECIMEN: NORMAL
INR PPP: 1.06 (ref 0.85–1.15)
INR PPP: 1.07 (ref 0.85–1.15)
MCH RBC QN AUTO: 30.3 PG (ref 26.5–33)
MCH RBC QN AUTO: 30.8 PG (ref 26.5–33)
MCHC RBC AUTO-ENTMCNC: 34.2 G/DL (ref 31.5–36.5)
MCHC RBC AUTO-ENTMCNC: 34.7 G/DL (ref 31.5–36.5)
MCV RBC AUTO: 89 FL (ref 78–100)
MCV RBC AUTO: 89 FL (ref 78–100)
PLATELET # BLD AUTO: 233 10E3/UL (ref 150–450)
PLATELET # BLD AUTO: 247 10E3/UL (ref 150–450)
POTASSIUM SERPL-SCNC: 4.2 MMOL/L (ref 3.4–5.3)
PROT SERPL-MCNC: 6 G/DL (ref 6.4–8.3)
RBC # BLD AUTO: 3.47 10E6/UL (ref 3.8–5.2)
RBC # BLD AUTO: 3.53 10E6/UL (ref 3.8–5.2)
SODIUM SERPL-SCNC: 133 MMOL/L (ref 135–145)
T PALLIDUM AB SER QL: NONREACTIVE
UNIT ABO/RH: NORMAL
UNIT NUMBER: NORMAL
UNIT STATUS: NORMAL
UNIT TYPE ISBT: 5100
UNIT TYPE ISBT: 6200
UNIT TYPE ISBT: 6200
WBC # BLD AUTO: 17.1 10E3/UL (ref 4–11)
WBC # BLD AUTO: 22.7 10E3/UL (ref 4–11)

## 2024-11-11 PROCEDURE — 999N000063 US INTRAOPERATIVE

## 2024-11-11 PROCEDURE — 88305 TISSUE EXAM BY PATHOLOGIST: CPT | Mod: TC | Performed by: OBSTETRICS & GYNECOLOGY

## 2024-11-11 PROCEDURE — 370N000017 HC ANESTHESIA TECHNICAL FEE, PER MIN: Performed by: OBSTETRICS & GYNECOLOGY

## 2024-11-11 PROCEDURE — 0W3R7ZZ CONTROL BLEEDING IN GENITOURINARY TRACT, VIA NATURAL OR ARTIFICIAL OPENING: ICD-10-PCS | Performed by: OBSTETRICS & GYNECOLOGY

## 2024-11-11 PROCEDURE — 250N000011 HC RX IP 250 OP 636

## 2024-11-11 PROCEDURE — 85384 FIBRINOGEN ACTIVITY: CPT | Performed by: OBSTETRICS & GYNECOLOGY

## 2024-11-11 PROCEDURE — 250N000011 HC RX IP 250 OP 636: Performed by: OBSTETRICS & GYNECOLOGY

## 2024-11-11 PROCEDURE — 36415 COLL VENOUS BLD VENIPUNCTURE: CPT | Performed by: OBSTETRICS & GYNECOLOGY

## 2024-11-11 PROCEDURE — 82040 ASSAY OF SERUM ALBUMIN: CPT | Performed by: OBSTETRICS & GYNECOLOGY

## 2024-11-11 PROCEDURE — 722N000001 HC LABOR CARE VAGINAL DELIVERY SINGLE

## 2024-11-11 PROCEDURE — 82565 ASSAY OF CREATININE: CPT | Performed by: OBSTETRICS & GYNECOLOGY

## 2024-11-11 PROCEDURE — 250N000013 HC RX MED GY IP 250 OP 250 PS 637: Performed by: OBSTETRICS & GYNECOLOGY

## 2024-11-11 PROCEDURE — 120N000013 HC R&B IMCU

## 2024-11-11 PROCEDURE — 258N000003 HC RX IP 258 OP 636: Performed by: NURSE ANESTHETIST, CERTIFIED REGISTERED

## 2024-11-11 PROCEDURE — 88307 TISSUE EXAM BY PATHOLOGIST: CPT | Mod: TC | Performed by: OBSTETRICS & GYNECOLOGY

## 2024-11-11 PROCEDURE — 360N000075 HC SURGERY LEVEL 2, PER MIN: Performed by: OBSTETRICS & GYNECOLOGY

## 2024-11-11 PROCEDURE — 85730 THROMBOPLASTIN TIME PARTIAL: CPT | Performed by: OBSTETRICS & GYNECOLOGY

## 2024-11-11 PROCEDURE — 250N000009 HC RX 250: Performed by: OBSTETRICS & GYNECOLOGY

## 2024-11-11 PROCEDURE — 0HQ9XZZ REPAIR PERINEUM SKIN, EXTERNAL APPROACH: ICD-10-PCS | Performed by: OBSTETRICS & GYNECOLOGY

## 2024-11-11 PROCEDURE — 88307 TISSUE EXAM BY PATHOLOGIST: CPT | Mod: 26 | Performed by: PATHOLOGY

## 2024-11-11 PROCEDURE — 250N000009 HC RX 250: Performed by: NURSE ANESTHETIST, CERTIFIED REGISTERED

## 2024-11-11 PROCEDURE — 250N000011 HC RX IP 250 OP 636: Performed by: NURSE ANESTHETIST, CERTIFIED REGISTERED

## 2024-11-11 PROCEDURE — 272N000001 HC OR GENERAL SUPPLY STERILE: Performed by: OBSTETRICS & GYNECOLOGY

## 2024-11-11 PROCEDURE — 85014 HEMATOCRIT: CPT | Performed by: OBSTETRICS & GYNECOLOGY

## 2024-11-11 PROCEDURE — 85610 PROTHROMBIN TIME: CPT | Performed by: OBSTETRICS & GYNECOLOGY

## 2024-11-11 PROCEDURE — 88305 TISSUE EXAM BY PATHOLOGIST: CPT | Mod: 26 | Performed by: PATHOLOGY

## 2024-11-11 PROCEDURE — 10D17Z9 MANUAL EXTRACTION OF PRODUCTS OF CONCEPTION, RETAINED, VIA NATURAL OR ARTIFICIAL OPENING: ICD-10-PCS | Performed by: OBSTETRICS & GYNECOLOGY

## 2024-11-11 PROCEDURE — 82310 ASSAY OF CALCIUM: CPT | Performed by: OBSTETRICS & GYNECOLOGY

## 2024-11-11 PROCEDURE — 258N000003 HC RX IP 258 OP 636: Performed by: OBSTETRICS & GYNECOLOGY

## 2024-11-11 RX ORDER — ONDANSETRON 2 MG/ML
INJECTION INTRAMUSCULAR; INTRAVENOUS PRN
Status: DISCONTINUED | OUTPATIENT
Start: 2024-11-11 | End: 2024-11-11

## 2024-11-11 RX ORDER — CARBOPROST TROMETHAMINE 250 UG/ML
250 INJECTION, SOLUTION INTRAMUSCULAR
Status: DISCONTINUED | OUTPATIENT
Start: 2024-11-11 | End: 2024-11-13 | Stop reason: HOSPADM

## 2024-11-11 RX ORDER — MODIFIED LANOLIN
OINTMENT (GRAM) TOPICAL
Status: DISCONTINUED | OUTPATIENT
Start: 2024-11-11 | End: 2024-11-13 | Stop reason: HOSPADM

## 2024-11-11 RX ORDER — METHYLERGONOVINE MALEATE 0.2 MG/ML
200 INJECTION INTRAVENOUS
Status: DISCONTINUED | OUTPATIENT
Start: 2024-11-11 | End: 2024-11-13 | Stop reason: HOSPADM

## 2024-11-11 RX ORDER — LOPERAMIDE HYDROCHLORIDE 2 MG/1
4 CAPSULE ORAL
Status: DISCONTINUED | OUTPATIENT
Start: 2024-11-11 | End: 2024-11-13 | Stop reason: HOSPADM

## 2024-11-11 RX ORDER — OXYTOCIN 10 [USP'U]/ML
10 INJECTION, SOLUTION INTRAMUSCULAR; INTRAVENOUS
Status: DISCONTINUED | OUTPATIENT
Start: 2024-11-11 | End: 2024-11-13 | Stop reason: HOSPADM

## 2024-11-11 RX ORDER — TRANEXAMIC ACID 10 MG/ML
1 INJECTION, SOLUTION INTRAVENOUS EVERY 30 MIN PRN
Status: DISCONTINUED | OUTPATIENT
Start: 2024-11-11 | End: 2024-11-13 | Stop reason: HOSPADM

## 2024-11-11 RX ORDER — CEFAZOLIN SODIUM/WATER 2 G/20 ML
2 SYRINGE (ML) INTRAVENOUS
Status: COMPLETED | OUTPATIENT
Start: 2024-11-11 | End: 2024-11-11

## 2024-11-11 RX ORDER — OXYTOCIN/0.9 % SODIUM CHLORIDE 30/500 ML
PLASTIC BAG, INJECTION (ML) INTRAVENOUS CONTINUOUS PRN
Status: DISCONTINUED | OUTPATIENT
Start: 2024-11-11 | End: 2024-11-11

## 2024-11-11 RX ORDER — HYDROCORTISONE 25 MG/G
CREAM TOPICAL 3 TIMES DAILY PRN
Status: DISCONTINUED | OUTPATIENT
Start: 2024-11-11 | End: 2024-11-13 | Stop reason: HOSPADM

## 2024-11-11 RX ORDER — DEXAMETHASONE SODIUM PHOSPHATE 4 MG/ML
INJECTION, SOLUTION INTRA-ARTICULAR; INTRALESIONAL; INTRAMUSCULAR; INTRAVENOUS; SOFT TISSUE PRN
Status: DISCONTINUED | OUTPATIENT
Start: 2024-11-11 | End: 2024-11-11

## 2024-11-11 RX ORDER — ACETAMINOPHEN 325 MG/1
650 TABLET ORAL EVERY 4 HOURS PRN
Status: DISCONTINUED | OUTPATIENT
Start: 2024-11-11 | End: 2024-11-13 | Stop reason: HOSPADM

## 2024-11-11 RX ORDER — BISACODYL 10 MG
10 SUPPOSITORY, RECTAL RECTAL DAILY PRN
Status: DISCONTINUED | OUTPATIENT
Start: 2024-11-11 | End: 2024-11-13 | Stop reason: HOSPADM

## 2024-11-11 RX ORDER — MISOPROSTOL 200 UG/1
400 TABLET ORAL
Status: DISCONTINUED | OUTPATIENT
Start: 2024-11-11 | End: 2024-11-13 | Stop reason: HOSPADM

## 2024-11-11 RX ORDER — CEFAZOLIN SODIUM 1 G/3ML
1 INJECTION, POWDER, FOR SOLUTION INTRAMUSCULAR; INTRAVENOUS ONCE
Status: DISCONTINUED | OUTPATIENT
Start: 2024-11-11 | End: 2024-11-11

## 2024-11-11 RX ORDER — DOCUSATE SODIUM 100 MG/1
100 CAPSULE, LIQUID FILLED ORAL DAILY
Status: DISCONTINUED | OUTPATIENT
Start: 2024-11-11 | End: 2024-11-13 | Stop reason: HOSPADM

## 2024-11-11 RX ORDER — FENTANYL CITRATE 50 UG/ML
50-100 INJECTION, SOLUTION INTRAMUSCULAR; INTRAVENOUS EVERY 30 MIN PRN
Status: DISCONTINUED | OUTPATIENT
Start: 2024-11-11 | End: 2024-11-11 | Stop reason: HOSPADM

## 2024-11-11 RX ORDER — LIDOCAINE HCL/EPINEPHRINE/PF 2%-1:200K
VIAL (ML) INJECTION PRN
Status: DISCONTINUED | OUTPATIENT
Start: 2024-11-11 | End: 2024-11-11

## 2024-11-11 RX ORDER — LOPERAMIDE HYDROCHLORIDE 2 MG/1
2 CAPSULE ORAL
Status: DISCONTINUED | OUTPATIENT
Start: 2024-11-11 | End: 2024-11-13 | Stop reason: HOSPADM

## 2024-11-11 RX ORDER — FENTANYL CITRATE-0.9 % NACL/PF 10 MCG/ML
100 PLASTIC BAG, INJECTION (ML) INTRAVENOUS EVERY 5 MIN PRN
Status: DISCONTINUED | OUTPATIENT
Start: 2024-11-11 | End: 2024-11-13 | Stop reason: HOSPADM

## 2024-11-11 RX ORDER — OXYTOCIN/0.9 % SODIUM CHLORIDE 30/500 ML
340 PLASTIC BAG, INJECTION (ML) INTRAVENOUS CONTINUOUS PRN
Status: DISCONTINUED | OUTPATIENT
Start: 2024-11-11 | End: 2024-11-13 | Stop reason: HOSPADM

## 2024-11-11 RX ORDER — IBUPROFEN 800 MG/1
800 TABLET, FILM COATED ORAL EVERY 6 HOURS PRN
Status: DISCONTINUED | OUTPATIENT
Start: 2024-11-11 | End: 2024-11-13 | Stop reason: HOSPADM

## 2024-11-11 RX ORDER — MISOPROSTOL 200 UG/1
800 TABLET ORAL
Status: DISCONTINUED | OUTPATIENT
Start: 2024-11-11 | End: 2024-11-13 | Stop reason: HOSPADM

## 2024-11-11 RX ADMIN — Medication: at 07:44

## 2024-11-11 RX ADMIN — LIDOCAINE HYDROCHLORIDE,EPINEPHRINE BITARTRATE 5 ML: 20; .005 INJECTION, SOLUTION EPIDURAL; INFILTRATION; INTRACAUDAL; PERINEURAL at 12:49

## 2024-11-11 RX ADMIN — ACETAMINOPHEN 650 MG: 325 TABLET ORAL at 20:35

## 2024-11-11 RX ADMIN — PHENYLEPHRINE HYDROCHLORIDE 100 MCG: 10 INJECTION INTRAVENOUS at 13:45

## 2024-11-11 RX ADMIN — Medication 2 MILLI-UNITS/MIN: at 04:19

## 2024-11-11 RX ADMIN — FENTANYL CITRATE 100 MCG: 50 INJECTION INTRAMUSCULAR; INTRAVENOUS at 11:57

## 2024-11-11 RX ADMIN — MIDAZOLAM 2 MG: 1 INJECTION INTRAMUSCULAR; INTRAVENOUS at 12:50

## 2024-11-11 RX ADMIN — ONDANSETRON 4 MG: 2 INJECTION INTRAMUSCULAR; INTRAVENOUS at 13:43

## 2024-11-11 RX ADMIN — Medication 100 MCG: at 14:15

## 2024-11-11 RX ADMIN — Medication 2 G: at 12:46

## 2024-11-11 RX ADMIN — SODIUM CHLORIDE, POTASSIUM CHLORIDE, SODIUM LACTATE AND CALCIUM CHLORIDE: 600; 310; 30; 20 INJECTION, SOLUTION INTRAVENOUS at 13:38

## 2024-11-11 RX ADMIN — LIDOCAINE HYDROCHLORIDE,EPINEPHRINE BITARTRATE 3 ML: 20; .005 INJECTION, SOLUTION EPIDURAL; INFILTRATION; INTRACAUDAL; PERINEURAL at 12:44

## 2024-11-11 RX ADMIN — IBUPROFEN 800 MG: 800 TABLET ORAL at 17:37

## 2024-11-11 RX ADMIN — METHYLERGONOVINE MALEATE 200 MCG: 0.2 INJECTION INTRAVENOUS at 13:13

## 2024-11-11 RX ADMIN — DEXAMETHASONE SODIUM PHOSPHATE 8 MG: 4 INJECTION, SOLUTION INTRA-ARTICULAR; INTRALESIONAL; INTRAMUSCULAR; INTRAVENOUS; SOFT TISSUE at 13:43

## 2024-11-11 RX ADMIN — Medication 340 ML/HR: at 11:38

## 2024-11-11 RX ADMIN — LIDOCAINE HYDROCHLORIDE,EPINEPHRINE BITARTRATE 2 ML: 20; .005 INJECTION, SOLUTION EPIDURAL; INFILTRATION; INTRACAUDAL; PERINEURAL at 12:46

## 2024-11-11 RX ADMIN — Medication 300 ML/HR: at 13:05

## 2024-11-11 NOTE — PROGRESS NOTES
Pt experienced hypotension after epidural placement and dosing. Pt received 2 doses of phenylephrine and a 250 bolus of IV fluid. At 2350 fetus had a prolonged decel lasting 6.5 minutes. Pt placed on right side, left side and hands and knees. Pitocin stopped. FHR returned to baseline.

## 2024-11-11 NOTE — L&D DELIVERY NOTE
Joann Toscano is a 29 year old Who presented with PPROM at 36 weeks 6 days.  She was started on Pitocin and slowly made progress who completed after rupture of forebag.  Upon reaching complete she began pushing and made slow, steady progress until delivery.  Patient did express concerns of fatigue during her pushing process and discussed risks and benefits of vacuum as well.  However patient did continue to push effectively and vacuum was not required.  She delivered a viable male infant with Apgars 8 and 8.  Initially attempted delivery of placenta with gentle traction which was unsuccessful and thus attention turned to laceration repair.  Began repairing first-degree laceration with 3-0 Vicryl suture.  During the laceration repair increased bleeding was noted and thus again, gentle traction was applied to the cord to aid in delivery of the placenta.  During this the cord did avulse and therefore attempt at manual removal was undertaken.  The uterine tone was noted to be quite high in the lower uterine segment impeding bimanual to address retained placenta.  Therefore requested that Pitocin be turned off.  With this there was some improvement in the high lower uterine segment tone which allowed hand to reach inside the uterine cavity.  The placenta seem to be quite adherent and was unable to develop a clear plane.  Thus did continue to work on developing a plane and delivering placenta.  Unfortunately placenta was only able to be removed in small portions.  Therefore, requested that my partner Dr. Esqueda join the delivery room to aid in manual removal.  Bedside ultrasound was placed.  Continued portions of placenta were noted and with multiple attempts by myself and Dr. Esqueda the majority of placenta was felt to be removed.  However on repeat ultrasound there was continued thickened area noted near the fundus that was unable to be reached or removed partially secondary to patient discomfort.  Patient did receive 100  mcg of fentanyl during this weeps.  Did not give further uterotonic's given the worried that this would impede the ability to reach the fundus for removal.  Did request that second IV be placed.  Additionally requested additional IV fluids be given.  When this portion was unable to be further removed decision made to proceed with emergent exam under anesthesia dilation and curettage/removal of retained placenta.  Patient was verbally consented for possible exploratory laparotomy, hysterectomy and blood transfusion.  Reviewed risks and benefits of surgery and patient agreed to proceed.  Informed consent signed.  Prior to going back to the operating room it was noted that patient had a family history of malignant hyperthermia and therefore this was discussed with anesthesia who made plan to keep patient safe, see their notes for details.  We did type and cross for several units of blood and had blood bank notified that massive transfusion protocol may be necessary should bleeding significantly increase with attempts at removal in the operating room.  EBL prior to entering the operating room was approximately 800 cc.  See operative note for details but in short patient was brought to the OR and small amount of retained products and clot were removed from the fundus.  There was an area initially felt to be concerning for possible further retained products but based on ultrasound and exam findings felt to be short endometrium as opposed to further retained products.  Uterine cavity both appeared on ultrasound and palpated clear of products at case end.  Patient's first-degree tear was repaired in standard fashion.  Given her increased bleeding and increased risk of further hemorrhage did place Kalyani balloon at case end to prevent atony in the lower uterine segment in the area of shredded endometrium.  She also had resumption of usual Pitocin as well as a dose of Methergine.  EBL from surgery approximately 250, total EBL  approximately 1050.  Coagulation studies within normal limits.  Sheth remains in place.  Was returned to postpartum in stable condition.  OB Vaginal Delivery Note    Joann Toscano MRN# [id number]   Age: 29 year old YOB: 1995       GA: 37w0d  GP:   Labor Complications: Failure to Progress in First Stage  EBL: 700 mL  Delivery QBL: 110 mL  Delivery Type: Vaginal, Spontaneous  ROM to Delivery Time: (Delivered) Days: 1 Hours: 6 Minutes: 37  Telephone Weight: 3.062 kg (6 lb 12 oz)   1 Minute 5 Minute 10 Minute   Apgar Totals: 8   8        ;PAULINA MOREL    Delivery Details:  Joann Toscano, a 29 year old  female delivered a viable infant with apgars of 8  and 8 . Patient was fully dilated and pushing after   hours   minutes in active labor. Delivery was via vaginal, spontaneous to a sterile field under epidural anesthesia. Infant delivered in vertex   occiput anterior position. Anterior and posterior shoulders delivered without difficulty. The cord was clamped, cut twice and 3 vessels were noted. Cord blood was obtained in routine fashion with the following disposition: lab.      Cord complications: none  Placenta delivered at 2024 12:50 PM. Placental disposition was Pathology. Fundal massage performed and fundus found to be firm.     Episiotomy: none   Perineum, vagina, cervix were inspected, and the following lacerations were noted:   Perineal lacerations: 1st               .    Excellent hemostasis was noted. Needle count correct. Infant and patient in delivery room in good and stable condition.        Elizabeth Toscano [[id number]]      Labor Event Times      Active labor onset date: 24 Onset time:  6:15 AM   Dilation complete date: 24 Complete time:  8:40 AM   Start pushing date/time: 2024 0842          Labor Events     labor?: Yes   steroids: None  Labor Type: Spontaneous, Augmentation  Predominate monitoring during 1st stage: continuous  "electronic fetal monitoring     Antibiotics received during labor?: No       Rupture date/time: 11/10/24 0500   Rupture type: Spontaneous Rupture of Membranes  Fluid color: Clear     Augmentation: Oxytocin, AROM  Indications for augmentation: Ineffective Contraction Pattern       Delivery/Placenta Date and Time      Delivery Date: 24 Delivery Time: 11:37 AM   Placenta Date/Time: 2024 12:50 PM  Oxytocin given at the time of delivery: after delivery of baby  Delivering clinician: Giovana Brandt MD   Other personnel present at delivery:  Provider Role   Silva Marin RN Delivery Nurse   Kristin Ortiz RN Charge Nurse             Vaginal Counts       Initial count performed by 2 team members:  Two Team Members   Tomeka Newman NST         Needles Suture Needles Sponges (RETIRED) Instruments   Initial counts 2  5    Added to count  1     Relief counts       Final counts 2 1 5            Placed during labor Accounted for at the end of labor   FSE No NA   IUPC No NA   Cervidil No NA                  Final count performed by 2 team members:  Two Team Members   Angel Brnadt      Final count correct?: Yes  Pre-Birth Team Brief: Complete  Post-Birth Team Debrief: Complete       Apgars    Living status: Living   1 Minute 5 Minute 10 Minute 15 Minute 20 Minute   Skin color: 1  1       Heart rate: 2  2       Reflex irritability: 2  2       Muscle tone: 1  1       Respiratory effort: 2  2       Total: 8  8       Apgars assigned by: SILVA MARIN RN       Cord      Vessels: 3 Vessels    Cord Complications: None               Cord Blood Disposition: Lab    Gases Sent?: No    Delayed cord clamping?: Yes    Cord Clamping Delay (seconds):  seconds            Resuscitation    Methods: None        Measurements      Weight: 6 lb 12 oz Length: 1' 7.75\"     Head circumference: 33 cm           Skin to Skin and Feeding Plan      Skin to skin initiation date/time: 1841    Skin to " skin with: Mother  Skin to skin end date/time: 1/11/1841           Labor Events and Shoulder Dystocia    Fetal Tracing Prior to Delivery: Category 2       Delivery (Maternal) (Provider to Complete) ([id number])    Episiotomy: None  Perineal lacerations: 1st    Est. blood loss (mL): 700  Repair suture: 3-0 Vicryl  Genital tract inspection done: Pos       Blood Loss  Mother: Joann Toscano #[id number]     Start of Mother's Information      Delivery Blood Loss   Intrapartum & Postpartum: 11/11/24 0615 - 11/11/24 1512    Delivery Admission: 11/10/24 1527 - 11/11/24 1512         Intrapartum & Postpartum Delivery Admission    EBL (mL) Hospital Encounter 700 mL 700 mL    Delivery QBL (mL) Hospital Encounter 110 mL 110 mL    EBL (mL) Anesthesia 250 mL 250 mL    Total  1060 mL 1060 mL               End of Mother's Information  Mother: Joann Toscano #[id number]                Delivery - Provider to Complete ([id number])    Delivering clinician: Giovana Brandt MD  Delivery Type (Choose the 1 that will go to the Birth History): Vaginal, Spontaneous                         Other personnel:  Provider Role   Silva Mclean RN Delivery Nurse   Kristin Ortiz RN Charge Nurse                    Placenta    Date/Time: 11/11/2024 12:50 PM  Removal: Manual Removal, Curettage  Disposition: Pathology             Anesthesia    Method: Epidural                    Presentation and Position    Presentation: Vertex     Occiput Anterior                     Giovana Brandt MD

## 2024-11-11 NOTE — OP NOTE
[facility]  Operative Note      Name: Joann Toscano  MRN: [id number]  : 1995  Date of surgery: 2024    Preoperative diagnosis:  - retained placenta    Postoperative diagnosis:  - same    Procedure(s):  - EUA, removal of retained placenta, vaginal laceration repair, Dee device placement under US guidance    Surgeon: Giovana Brandt MD   Assist: Yin Esqueda MD    Anesthesia: MAC  EBL: 250 ml from procedure, about 800 ml prior to procedure  Urine output: see anesthesia notes  IV fluids: see anesthesia notes    Specimens: retained placenta  Complications: None apparent     Findings: 1st degree vaginal laceration, uterus enlarged with small area of retained clot/placenta on manual exam and US, area in NIYA that on palpation and imaging seems consistent with stredded endometrium, no blood flow enlargement concerning for further retained placenta    Indications: Joann Toscano is a 29 year old  who delivered via , see note for details. Upon delivery placenta was slow to deliver and laceration repair was begun. Shortly into repair further bleeding was noted and placenta again attempted to be delivered. Cord avulsed with gentle traction and manual removal was attempted. Initially difficult 2/2 excellent tone and thus pitocin turned off. Repeat attempt was successful in removing small portions of placenta but large portion remained and concern for focal accreta developed. Therefore Dr. Esqueda called to assist and with multiple attempts at removal, able to remove large portion of placenta but with bedside US concern for further retained placenta at fundus. Therefore decision made to go to the OR for above. Reviewed risks, benefits and alternatives and pt agreed to proceed.       Procedure in detail:  The patient was taken to the operating room where MAC anesthesia was administered and found to be adequate. She was positioned into dorsal lithotomy and exam was performed.  She was prepped and draped in the usual sterile manner. Time out procedure was performed for patient safety.    Attention turned to the vagina where ultrasound was placed transabdominally and with bimanual exam endometrial cavity thoroughly palpated.  Near the lower uterine segment there was an area of tissue that was partially avulsed from the uterine wall.  Initially felt this to be possible retained products but with ultrasound guidance did not appear concerning for retained products without blood flow or obvious thickening.  Therefore felt this was more likely to be shredded endometrium from previous attempts at manual removal.  With further examination with ultrasound there was an area of focal thickening with perhaps small amount of blood flow on Doppler study near the fundus.  Over the course of several sweeps this area was cleared and found to be thin and normal in appearance on repeat ultrasound after sweeps by both myself and Dr. Esqueda.  Bleeding throughout this process was overall quite light.  She was given Methergine and Pitocin restarted after the fundus was felt to be clear as previously  her uterine tone had been impeding adequate sweeps.  Once sweeps were completed request these be given.  Again, given the shredded endometrium in the lower intermittent segment and concern that this could contribute to atony if bleeding did have Kalyani device requested and placed in standard fashion with 100 cc in the balloon.  Will plan to have this attached to wall suction upon entry to postpartum room.  Patient was further examined and noted to have a first-degree tear which was repaired with 3-0 Vicryl.  Sheth left in place at procedure end given the Kalyani device in place.  Initially urine was cherry in color but the tubing showed clear yellow urine at case end.  Patient tolerated procedure well.    Sponge and needle counts were correct x2. The patient tolerated the procedure well and was taken to the recovery area  in stable condition.    Giovana Brandt MD   11/11/2024 2:15 PM

## 2024-11-11 NOTE — PROGRESS NOTES
Patient admitted and oriented to room 2053. Spouse, Isaak is with her. Admission complete and labs collected. Pitocin started at 1711. Patient currently on 4m/unit(s) of pitocin and rates pain at 1/10. Encouraged movement/ walking hallways.

## 2024-11-11 NOTE — ANESTHESIA PREPROCEDURE EVALUATION
Anesthesia Pre-Procedure Evaluation    Patient: Joann Toscano   MRN: [id number] : 1995        Procedure :           Past Medical History:   Diagnosis Date     Anxiety      Depression       Past Surgical History:   Procedure Laterality Date     MOUTH SURGERY      wisdom teeth      Allergies   Allergen Reactions     Measles And Rubella Virus Vaccine Hives      Social History     Tobacco Use     Smoking status: Never     Passive exposure: Current     Smokeless tobacco: Never   Substance Use Topics     Alcohol use: Not Currently     Comment: Alcoholic Drinks/day: social-quit with pregnancy      Wt Readings from Last 1 Encounters:   24 103.6 kg (228 lb 4.8 oz)        Anesthesia Evaluation   Pt has had prior anesthetic. Type: MAC.    History of anesthetic complications  - malignant hyperthermia.  known family history.    ROS/MED HX  ENT/Pulmonary:  - neg pulmonary ROS     Neurologic:  - neg neurologic ROS     Cardiovascular:  - neg cardiovascular ROS     METS/Exercise Tolerance:     Hematologic:     (+)      anemia,          Musculoskeletal:  - neg musculoskeletal ROS     GI/Hepatic:  - neg GI/hepatic ROS     Renal/Genitourinary:  - neg Renal ROS     Endo: Comment: Morbid obesity    (+)               Obesity,       Psychiatric/Substance Use:  - neg psychiatric ROS     Infectious Disease:       Malignancy:  - neg malignancy ROS     Other: Comment: 1 hr pp - neg other ROS   (-) Any chance pregnant       Physical Exam    Airway        Mallampati: II   TM distance: > 3 FB   Neck ROM: full   Mouth opening: > 3 cm    Respiratory Devices and Support  Comment: Not on oxygen currently       Dental  no notable dental history         Cardiovascular   cardiovascular exam normal          Pulmonary   pulmonary exam normal              OUTSIDE LABS:  CBC:   Lab Results   Component Value Date    WBC 11.5 (H) 11/10/2024    WBC 9.6 2024    HGB 12.9 11/10/2024    HGB 11.3 (L) 2024    HCT 37.1 11/10/2024    HCT  "33.9 (L) 08/29/2024     11/10/2024     08/29/2024     BMP:   Lab Results   Component Value Date     02/06/2019    POTASSIUM 4.7 02/06/2019    CHLORIDE 106 02/06/2019    CO2 31 02/06/2019    BUN 14 02/06/2019    CR 0.86 02/06/2019    GLC 87 02/06/2019     COAGS: No results found for: \"PTT\", \"INR\", \"FIBR\"  POC: No results found for: \"BGM\", \"HCG\", \"HCGS\"  HEPATIC:   Lab Results   Component Value Date    ALBUMIN 3.9 02/06/2019    PROTTOTAL 7.0 02/06/2019    ALT 22 02/06/2019    AST 15 02/06/2019    ALKPHOS 99 02/06/2019    BILITOTAL 0.4 02/06/2019     OTHER:   Lab Results   Component Value Date    A1C 5.2 05/13/2024    LAM 9.8 02/06/2019    TSH 5.28 (H) 02/06/2019       Anesthesia Plan    ASA Status:  3    NPO Status:  NPO Appropriate    Anesthesia Type: Epidural.      Maintenance: Balanced.        Consents    Anesthesia Plan(s) and associated risks, benefits, and realistic alternatives discussed. Questions answered and patient/representative(s) expressed understanding.     - Discussed: Risks, Benefits and Alternatives for BOTH SEDATION and the PROCEDURE were discussed     - Discussed with:  Patient, Spouse            Postoperative Care    Pain management: IV analgesics, Postop Epidural.   PONV prophylaxis: Ondansetron (or other 5HT-3), Dexamethasone or Solumedrol     Comments:               JAVY Sena CRNA    I have reviewed the pertinent notes and labs in the chart from the past 30 days and (re)examined the patient.  Any updates or changes from those notes are reflected in this note.                                   "

## 2024-11-11 NOTE — ANESTHESIA POSTPROCEDURE EVALUATION
Patient: Joann Toscano    Procedure: Procedure(s):  Exam under Anesthesia, Dilation and curettage, removal of retained placenta, vaginal laceration repair and Dee placement       Anesthesia Type:  Epidural    Note:  Disposition: Inpatient   Postop Pain Control: Uneventful            Sign Out: Well controlled pain   PONV: No   Neuro/Psych: Uneventful            Sign Out: Acceptable/Baseline neuro status   Airway/Respiratory: Uneventful            Sign Out: Acceptable/Baseline resp. status   CV/Hemodynamics: Uneventful            Sign Out: Acceptable CV status; No obvious hypovolemia; No obvious fluid overload   Other NRE: NONE   DID A NON-ROUTINE EVENT OCCUR? No       Last vitals:  Vitals:    11/11/24 1427 11/11/24 1430 11/11/24 1445   BP: 106/77 106/77 123/53   Pulse: 81     Resp:   16   Temp:   36.9  C (98.5  F)   SpO2: 99% 99% 100%       Electronically Signed By: JVAY Russell CRNA  November 11, 2024  3:42 PM

## 2024-11-11 NOTE — PROGRESS NOTES
"S:Delivery  B:Augmented  Labor,37w0d    No results found for: \"GBS\" with antibiotic treatment not indicated 4 hours prior to delivery.  A: Patient delivered   lac 1st degree at 1137 with Dr. HEATHER Brandt in attendance and baby placed on mother's abdomen for delayed cord clamping. Baby dried and stimulated. Baby placed  skin to skin @ 1138. Apgars 8/8.Delivery QBL 1060.  IV infusion of Oxytocin started then requested to be stopped by MD. Dr Brandt attempted manual removal-then Dr Esqueda called to assist.  Pt taken back to the OR.  Placenta removal manual. MD does want placenta sent to pathology.  See Flowsheet for VS and PP checks.  .  Labor care plan goals met, transition now to postpartum care. Postpartum QBL pending  R: Expect routine postpartum care. Anticipate first feeding within the hour or whenever infant displays feeding cues. Continue skin to skin. Prior discussion with mother indicates that feeding plan is Breast feeding . Educated mother on importance of exclusive breastfeeding, expected feeding readiness cues and encouraged her to observe for these cues while rooming in. Informed her that breastfeeding assistance would be provided.  "

## 2024-11-11 NOTE — ANESTHESIA CARE TRANSFER NOTE
Patient: Joann Toscano    Procedure: Procedure(s):  Exam under Anesthesia, Dilation and curettage, removal of retained placenta, vaginal laceration repair and Dee placement       Diagnosis: Retained placenta, unspecified portion of placenta [O73.0]  Diagnosis Additional Information: No value filed.    Anesthesia Type:   Epidural     Note:    Oropharynx: oropharynx clear of all foreign objects and spontaneously breathing  Level of Consciousness: awake and drowsy      Independent Airway: airway patency satisfactory and stable  Dentition: dentition unchanged  Vital Signs Stable: post-procedure vital signs reviewed and stable  Report to RN Given: handoff report given  Patient transferred to: PACU    Handoff Report: Identifed the Patient, Identified the Reponsible Provider, Reviewed the pertinent medical history, Discussed the surgical course, Reviewed Intra-OP anesthesia mangement and issues during anesthesia, Set expectations for post-procedure period and Allowed opportunity for questions and acknowledgement of understanding  Vitals:  Vitals Value Taken Time   BP     Temp     Pulse     Resp     SpO2         Electronically Signed By: JAVY Russell CRNA  November 11, 2024  2:07 PM

## 2024-11-11 NOTE — ANESTHESIA PROCEDURE NOTES
"Epidural catheter Procedure Note    Pre-Procedure   Staff -        CRNA: Franck Soto APRN CRNA       Performed By: CRNA       Procedure Start/Stop Times: 11/10/2024 10:55 PM and 11/10/2024 11:25 PM       Pre-Anesthestic Checklist: patient identified, IV checked, risks and benefits discussed, informed consent, monitors and equipment checked, pre-op evaluation, at physician/surgeon's request and post-op pain management  Timeout:       Correct Patient: Yes        Correct Procedure: Yes        Correct Site: Yes        Correct Position: Yes   Procedure Documentation  Procedure: epidural catheter       Patient Position: sitting       Skin prep: Betadine       Local skin infiltrated with mL of 2% lidocaine.        Insertion Site: L3-4. (midline approach).       Technique: LORT saline and LORT air        RAMON at 9 cm.       Needle Type: [employer] (TUGG) needle       Needle Gauge: 17.        Needle Length (Inches): 3.5           Catheter threaded easily.         5 cm epidural space.         Threaded 14 cm at skin.         # of attempts: 2 and  # of redirects:  2    Assessment/Narrative         Paresthesias: No.       Test dose of mL lidocaine 1.5% w/ 1:200,000 epinephrine at 11:25 CST.        .       Insertion/Infusion Method: LORT saline and LORT air    Medication(s) Administered   0.125% Bupivacaine + 2 mcg/mL Fentanyl via CADD (Epidural) - EPIDURAL   10 mL - 11/10/2024 11:35:00 PM  Medication Administration Time: 11/10/2024 10:55 PM      FOR [facility] ([facility]) ONLY:   Pain Team Contact information: please page the Pain Team [address] [employer]. Search \"Pain\". During daytime hours, please page the attending first. At night please page the resident first.      "

## 2024-11-11 NOTE — PLAN OF CARE
Pt vital signs stable. Pitocin restarted at 0419 and currently at 4 mU. Pt feeling pressure but coping well. Intermittent Cat II tracing throughout shift. Position changes, bolus and encouraging fluids as needed - see flowsheet.

## 2024-11-12 LAB
HGB BLD-MCNC: 9.8 G/DL (ref 11.7–15.7)
HOLD SPECIMEN: NORMAL

## 2024-11-12 PROCEDURE — 36415 COLL VENOUS BLD VENIPUNCTURE: CPT | Performed by: OBSTETRICS & GYNECOLOGY

## 2024-11-12 PROCEDURE — 120N000013 HC R&B IMCU

## 2024-11-12 PROCEDURE — 85018 HEMOGLOBIN: CPT | Performed by: OBSTETRICS & GYNECOLOGY

## 2024-11-12 PROCEDURE — 250N000013 HC RX MED GY IP 250 OP 250 PS 637: Performed by: OBSTETRICS & GYNECOLOGY

## 2024-11-12 RX ADMIN — IBUPROFEN 800 MG: 800 TABLET ORAL at 17:37

## 2024-11-12 RX ADMIN — ACETAMINOPHEN 650 MG: 325 TABLET ORAL at 13:40

## 2024-11-12 RX ADMIN — DOCUSATE SODIUM 100 MG: 100 CAPSULE, LIQUID FILLED ORAL at 07:49

## 2024-11-12 RX ADMIN — IBUPROFEN 800 MG: 800 TABLET ORAL at 07:48

## 2024-11-12 NOTE — PLAN OF CARE
Goal Outcome Evaluation:      Plan of Care Reviewed With: patient, spouse    Overall Patient Progress: improving    Data: Vital signs within normal limits. Postpartum checks within normal limits - see flow record. Patient  is tolerating po intake. Patient is able to empty bladder independently. Patient ambulating independently. No apparent signs of infection. Lac 1st degree healing well. Patient is performing self cares and is able to care for infant. Positive attachment behaviors are observed with infant. Support persons are present.  Action:  Pain plan was discussed. Patient will request pain med when she is ready for it. Patient was medicated during the shift for pain. See MAR. Patient education done about breastfeeding,  cares, pain management/plan, and rest. See flow record.  Response:   Patient reassessed within 1 hour after each medication for pain. Patient stated that pain had improved. Patient stated that she was comfortable. .   Plan: Anticipate discharge on 2024.    Pt was able to get up and pee 2 times. The second one was not measured but confirmed by nurse. Pt got up to the bathroom with minimal help. Was standby assist second time. Explain that pt could get up and walk on own unless any signs of dizziness or lightheadedness than to please call.

## 2024-11-12 NOTE — MEDICATION SCRIBE - ADMISSION MEDICATION HISTORY
Medication Scribe Admission Medication History    Admission medication history is complete. The information provided in this note is only as accurate as the sources available at the time of the update.    Information Source(s): Patient and CareEverywhere/SureScripts via  with patient by room phone.    Pertinent Information: None.    Changes made to PTA medication list:  Added: None  Deleted: None  Changed: None    Allergies reviewed with patient and updates made in EHR: yes, no change.    Medication History Completed By: Nneka Dias 11/12/2024 2:10 PM    PTA Med List   Medication Sig Last Dose/Taking    Prenatal Vit-Fe Fumarate-FA (PRENATAL MULTIVITAMIN  PLUS IRON) 27-1 MG TABS Take 1 tablet by mouth daily Past Month Evening

## 2024-11-12 NOTE — PROGRESS NOTES
Pt blood pressures normal after dose of phenylephrine and finishing lactate ringers.  There was no bleeding around Dee which was removed at 1800 per Dr Brandt verbal order.  Sheth and epidural also removed.  Pt is tolerating food and fluids without nausea. Saline lock x2 left in place.   Joann is feeding infant on demand.  Currently on bedrest until needs to use the bathroom.  Enc to call for help and not get up alone.   is attentive and at bedside.  Ibuprofen given for a pain of 2.

## 2024-11-12 NOTE — PLAN OF CARE
Mother complained of cramping and sore bottom. Motrin given with relief. Encouraged soaking in bath tub. Plan to assist with breast feeding as needed.

## 2024-11-12 NOTE — PROGRESS NOTES
[facility] Daily Postpartum Note    S: Ms. Toscano is feeling well this morning. She denies any complaints. Her pain is well-controlled on oral pain medications. She tolerating a regular diet without nausea or vomiting. Ambulating without difficulty. Voiding spontaneously. Lochia is decreasing.     O:   VS: Patient Vitals for the past 24 hrs:   BP Temp Temp src Pulse Resp SpO2   24 0800 -- 97.7  F (36.5  C) Oral 76 16 --   24 0305 133/89 98.4  F (36.9  C) Oral 79 16 --   24 2206 121/73 98.5  F (36.9  C) Oral 92 17 --   24 1817 114/65 97.8  F (36.6  C) Oral 85 16 97 %   24 1600 123/72 -- -- 64 -- 100 %   24 1530 111/77 -- -- 86 -- 100 %   24 1515 113/79 -- -- 86 -- 100 %   24 1500 122/76 -- -- 90 -- 100 %   24 1445 123/53 98.5  F (36.9  C) Oral 88 16 100 %   24 1430 106/77 -- -- -- -- 99 %   24 1427 106/77 -- -- 81 -- 99 %   24 1420 106/69 -- -- 85 -- --   24 1415 116/44 -- -- 105 -- --   24 1040 -- 98.5  F (36.9  C) Oral -- -- --     General: resting in bed, in NAD  CV: Reg rate, warm and well perfused  Resp: breathing comfortably on room air   Abdomen: soft, appropriately tender, nondistended  Fundus firm 2cm below the umbilicus  Extremities: non-tender, non-edematous     Recent Labs   Lab 24  0640 24  1959 24  1220 11/10/24  1644   HGB 9.8* 10.7* 10.7* 12.9       A/P: 28 yo P1, PPD#1 s/p , POD#1 D&C for retained placenta   PPH: bleeding clinical stable, Hgb and VSS    Anticipate discharge PPD#2    Chapis Ayoub MD, MD  [facility]   2024 9:22 AM

## 2024-11-13 VITALS
SYSTOLIC BLOOD PRESSURE: 112 MMHG | OXYGEN SATURATION: 97 % | RESPIRATION RATE: 16 BRPM | TEMPERATURE: 98.6 F | HEART RATE: 85 BPM | DIASTOLIC BLOOD PRESSURE: 65 MMHG

## 2024-11-13 PROCEDURE — 250N000013 HC RX MED GY IP 250 OP 250 PS 637: Performed by: OBSTETRICS & GYNECOLOGY

## 2024-11-13 RX ORDER — DOCUSATE SODIUM 100 MG/1
100 CAPSULE, LIQUID FILLED ORAL DAILY
Qty: 30 CAPSULE | Refills: 1 | Status: SHIPPED | OUTPATIENT
Start: 2024-11-14

## 2024-11-13 RX ORDER — ACETAMINOPHEN 325 MG/1
650 TABLET ORAL EVERY 4 HOURS PRN
COMMUNITY
Start: 2024-11-13

## 2024-11-13 RX ORDER — IBUPROFEN 800 MG/1
800 TABLET, FILM COATED ORAL EVERY 6 HOURS PRN
Qty: 30 TABLET | Refills: 1 | Status: SHIPPED | OUTPATIENT
Start: 2024-11-13

## 2024-11-13 RX ADMIN — IBUPROFEN 800 MG: 800 TABLET ORAL at 08:07

## 2024-11-13 RX ADMIN — DOCUSATE SODIUM 100 MG: 100 CAPSULE, LIQUID FILLED ORAL at 08:07

## 2024-11-13 ASSESSMENT — ACTIVITIES OF DAILY LIVING (ADL)
ADLS_ACUITY_SCORE: 0

## 2024-11-13 NOTE — PLAN OF CARE
Patient discharged per wheelchair with infant in car seat. Mother verified that her band matches her infant's band by comparing the infant's #.  Discharge instructions given. Encouraged to call for any problems, questions or concerns. RXs at [facility].      Nadia Bolivar RN on 11/13/2024 at 1:38 PM

## 2024-11-13 NOTE — DISCHARGE SUMMARY
Admit date: 11/10/2024     Discharge date: 2024     Admit Dx:   - 29 year old y/o   at 36w6d   - PROM    Discharge Dx:  - Same as above, s/p   -acute anemia secondary to blood loss, not present on admission  -retained placenta, not present on admission  -postpartum hemorrhage, not present on admission    Procedures:  -    - D&C under ultrasound guidance with JOHN placed       Admit HPI:  Ms. Joann Toscano is a 29 year old  at 37w0d  who was admitted on 11/10/2024 for PROM.  Please see her admit H&P for full details of her PMH, PSH, Meds, Allergies and exam on admit.    Hospital summary:   Joann Toscano is a 29 year old Who presented with PPROM at 36 weeks 6 days.  She was started on Pitocin and slowly made progress who completed after rupture of forebag.  Upon reaching complete she began pushing and made slow, steady progress until delivery.  Patient did express concerns of fatigue during her pushing process and discussed risks and benefits of vacuum as well.  However patient did continue to push effectively and vacuum was not required.  She delivered a viable male infant with Apgars 8 and 8.  Initially attempted delivery of placenta with gentle traction which was unsuccessful and thus attention turned to laceration repair.  Began repairing first-degree laceration with 3-0 Vicryl suture.  During the laceration repair increased bleeding was noted and thus again, gentle traction was applied to the cord to aid in delivery of the placenta.  During this the cord did avulse and therefore attempt at manual removal was undertaken.  The uterine tone was noted to be quite high in the lower uterine segment impeding bimanual to address retained placenta.  Therefore requested that Pitocin be turned off.  With this there was some improvement in the high lower uterine segment tone which allowed hand to reach inside the uterine cavity.  The placenta seem to be quite adherent and was unable to develop a  clear plane.  Thus did continue to work on developing a plane and delivering placenta.  Unfortunately placenta was only able to be removed in small portions.  Therefore, requested that my partner Dr. Esqueda join the delivery room to aid in manual removal.  Bedside ultrasound was placed.  Continued portions of placenta were noted and with multiple attempts by myself and Dr. Esqueda the majority of placenta was felt to be removed.  However on repeat ultrasound there was continued thickened area noted near the fundus that was unable to be reached or removed partially secondary to patient discomfort.  Patient did receive 100 mcg of fentanyl during this weeps.  Did not give further uterotonic's given the worried that this would impede the ability to reach the fundus for removal.  Did request that second IV be placed.  Additionally requested additional IV fluids be given.  When this portion was unable to be further removed decision made to proceed with emergent exam under anesthesia dilation and curettage/removal of retained placenta.  Patient was verbally consented for possible exploratory laparotomy, hysterectomy and blood transfusion.  Reviewed risks and benefits of surgery and patient agreed to proceed.  Informed consent signed.  Prior to going back to the operating room it was noted that patient had a family history of malignant hyperthermia and therefore this was discussed with anesthesia who made plan to keep patient safe, see their notes for details.  We did type and cross for several units of blood and had blood bank notified that massive transfusion protocol may be necessary should bleeding significantly increase with attempts at removal in the operating room.  EBL prior to entering the operating room was approximately 800 cc.  See operative note for details but in short patient was brought to the OR and small amount of retained products and clot were removed from the fundus.  There was an area initially felt to be  concerning for possible further retained products but based on ultrasound and exam findings felt to be short endometrium as opposed to further retained products.  Uterine cavity both appeared on ultrasound and palpated clear of products at case end.  Patient's first-degree tear was repaired in standard fashion.  Given her increased bleeding and increased risk of further hemorrhage did place Dee balloon at case end to prevent atony in the lower uterine segment in the area of shredded endometrium.  She also had resumption of usual Pitocin as well as a dose of Methergine.  EBL from surgery approximately 250, total EBL approximately 1050.  Coagulation studies within normal limits.  Sheth remains in place.  Was returned to postpartum in stable condition. The Dee was later removed and hemostasis and minimal bleeding was noted.     On PPD#2, she was meeting all of her postpartum goals and deemed stable for discharge. She was voiding without difficulty, tolerating a regular diet without nausea and vomiting, her pain was well controlled on oral pain medicines and her lochia was appropriate. Her hemoglobin after delivery was 9.4. Her Rh status was pos and Rhogam was not indicated. At the time of discharge, she was breast feeding her infant.     Physical exam on the day of discharge:  Patient Vitals for the past 24 hrs:   BP Temp Temp src Pulse Resp   11/13/24 0835 112/65 98.6  F (37  C) Oral 85 16   11/13/24 0255 103/54 97.6  F (36.4  C) Oral 83 16   11/12/24 1737 118/72 99.1  F (37.3  C) Oral 92 16          General: sitting up, alert and cooperative  Heart: reg rate, well perfused  Lungs: no increased work of breathing  Abd: soft, non-distended, non-tender. Fundus firm, nontender, below umbilicus.   Extremities: calves nontender, trace edema of lower extremities bilaterally    Lab Results   Component Value Date    HGB 9.8 11/12/2024    HGB 10.7 11/11/2024     Blood type: No results found for:  "\"RH\"    Discharge/Disposition:  Joann Toscano was discharged to home in stable condition with the following instructions/medications:  1) Call for temperature > 100.4, foul smelling vaginal discharge, bleeding > 1 pad per hour x 2 hrs, pain not controlled by oral pain meds, severe constipation or severe nausea or vomiting.  2) She was instructed to follow-up with her primary OB in 6 weeks for a routine postpartum visit.  3) She was instructed to continue her PNV on discharge if she wished to breast feed her infant.  4) She was discharged home with the following medications: Ibuprofen, tylenol and colace  5) anemia treated with iron in prenatal vitamins.    Jessica Whitlock MD   11/13/2024 8:57 AM    "

## 2024-11-13 NOTE — PLAN OF CARE
Goal Outcome Evaluation:      Plan of Care Reviewed With: patient, spouse    Overall Patient Progress: improving    Data: Vital signs within normal limits. Postpartum checks within normal limits - see flow record. Patient  is tolerating po intake. Patient is able to empty bladder independently. Patient ambulating independently. No apparent signs of infection. Lac 1st degree vaginal healing well. Patient is performing self cares and is able to care for infant. Positive attachment behaviors are observed with infant. Support persons are present.  Action:  Pain plan was discussed. Patient will request pain med when she is ready for it. Patient was not medicated during the shift. Patient education done about pain management/plan and rest. See flow record.  Response:   Patient encouraged to let nurse if pain medication is needed.   Plan: Anticipate discharge on 11/13/24.

## 2024-11-13 NOTE — DISCHARGE INSTRUCTIONS
Warning Signs after Having a Baby    Keep this paper on your fridge or somewhere else where you can see it.    Call your provider if you have any of these symptoms up to 12 weeks after having your baby.    Thoughts of hurting yourself or your baby  Pain in your chest or trouble breathing  Severe headache not helped by pain medicine  Eyesight concerns (blurry vision, seeing spots or flashes of light, other changes to eyesight)  Fainting, shaking or other signs of a seizure    Call 9-1-1 if you feel that it is an emergency.     The symptoms below can happen to anyone after giving birth. They can be very serious. Call your provider if you have any of these warning signs.    My provider s phone number: Nurse triage [phone]    Losing too much blood (hemorrhage)    Call your provider if you soak through a pad in less than an hour or pass blood clots bigger than a golf ball. These may be signs that you are bleeding too much.    Blood clots in the legs or lungs    After you give birth, your body naturally clots its blood to help prevent blood loss. Sometimes this increased clotting can happen in other areas of the body, like the legs or lungs. This can block your blood flow and be very dangerous.     Call your provider if you:  Have a red, swollen spot on the back of your leg that is warm or painful when you touch it.   Are coughing up blood.     Infection    Call your provider if you have any of these symptoms:  Fever of 100.4 F (38 C) or higher.  Pain or redness around your stitches if you had an incision.   Any yellow, white, or green fluid coming from places where you had stitches or surgery.    Mood Problems (postpartum depression)    Many people feel sad or have mood changes after having a baby. But for some people, these mood swings are worse.     Call your provider right away if you feel so anxious or nervous that you can't care for yourself or your baby.    Preeclampsia (high blood pressure)    Even if you  didn't have high blood pressure when you were pregnant, you are at risk for the high blood pressure disease called preeclampsia. This risk can last up to 12 weeks after giving birth.     Call your provider if you have:   Pain on your right side under your rib cage  Sudden swelling in the hands and face    Remember: You know your body. If something doesn't feel right, get medical help.     For informational purposes only. Not to replace the advice of your health care provider. Copyright 2020 [facility]. All rights reserved. Clinically reviewed by Danielle Louise, RNC-OB, MSN. [employer] [id number] - Rev 02/23.

## 2024-11-14 LAB
PATH REPORT.COMMENTS IMP SPEC: NORMAL
PATH REPORT.FINAL DX SPEC: NORMAL
PATH REPORT.FINAL DX SPEC: NORMAL
PATH REPORT.GROSS SPEC: NORMAL
PATH REPORT.GROSS SPEC: NORMAL
PATH REPORT.MICROSCOPIC SPEC OTHER STN: NORMAL
PATH REPORT.MICROSCOPIC SPEC OTHER STN: NORMAL
PATH REPORT.RELEVANT HX SPEC: NORMAL
PATH REPORT.RELEVANT HX SPEC: NORMAL
PHOTO IMAGE: NORMAL
PHOTO IMAGE: NORMAL

## 2025-03-12 ENCOUNTER — PATIENT OUTREACH (OUTPATIENT)
Dept: CARE COORDINATION | Facility: CLINIC | Age: 30
End: 2025-03-12
Payer: COMMERCIAL

## 2025-03-17 ENCOUNTER — MEDICAL CORRESPONDENCE (OUTPATIENT)
Dept: HEALTH INFORMATION MANAGEMENT | Facility: CLINIC | Age: 30
End: 2025-03-17
Payer: COMMERCIAL

## 2025-03-26 ENCOUNTER — PATIENT OUTREACH (OUTPATIENT)
Dept: CARE COORDINATION | Facility: CLINIC | Age: 30
End: 2025-03-26
Payer: COMMERCIAL

## 2025-05-25 ENCOUNTER — HEALTH MAINTENANCE LETTER (OUTPATIENT)
Age: 30
End: 2025-05-25

## 2025-06-04 ENCOUNTER — MEDICAL CORRESPONDENCE (OUTPATIENT)
Dept: HEALTH INFORMATION MANAGEMENT | Facility: CLINIC | Age: 30
End: 2025-06-04
Payer: COMMERCIAL

## (undated) DEVICE — PACK LAPAROSCOPY/PELVISCOPY STD

## (undated) DEVICE — SOL NACL 0.9% IRRIG 1000ML BOTTLE 07138-09

## (undated) DEVICE — GLOVE BIOGEL PI MICRO INDICATOR UNDERGLOVE SZ 6.0 48960

## (undated) DEVICE — GOWN LG DISP 9515

## (undated) DEVICE — SU VICRYL 3-0 SH 27" UND J416H

## (undated) DEVICE — PAD PERI INDIV WRAP 11" 2022

## (undated) DEVICE — SYSTEM HEMORRHAGE CNTRL JADA SYS 2.0 VAC-INDUCED 1047196

## (undated) DEVICE — GLOVE BIOGEL PI MICRO SZ 5.5 48555

## (undated) DEVICE — SPONGE RAY-TEC 4X8" 7318

## (undated) DEVICE — SYR 03ML 22GA 1.5" ECLIPSE

## (undated) DEVICE — Device